# Patient Record
Sex: MALE | Race: WHITE | NOT HISPANIC OR LATINO | Employment: OTHER | URBAN - METROPOLITAN AREA
[De-identification: names, ages, dates, MRNs, and addresses within clinical notes are randomized per-mention and may not be internally consistent; named-entity substitution may affect disease eponyms.]

---

## 2017-07-20 ENCOUNTER — APPOINTMENT (OUTPATIENT)
Dept: GENERAL RADIOLOGY | Facility: HOSPITAL | Age: 72
End: 2017-07-20

## 2017-07-20 ENCOUNTER — HOSPITAL ENCOUNTER (INPATIENT)
Facility: HOSPITAL | Age: 72
LOS: 5 days | Discharge: HOME | End: 2017-07-25
Attending: INTERNAL MEDICINE | Admitting: INTERNAL MEDICINE

## 2017-07-20 PROBLEM — Z99.89 OSA ON CPAP: Status: ACTIVE | Noted: 2017-07-20

## 2017-07-20 PROBLEM — F10.11 H/O ETOH ABUSE: Status: ACTIVE | Noted: 2017-07-20

## 2017-07-20 PROBLEM — E87.1 HYPONATREMIA: Status: ACTIVE | Noted: 2017-07-20

## 2017-07-20 PROBLEM — E78.5 HLD (HYPERLIPIDEMIA): Status: ACTIVE | Noted: 2017-07-20

## 2017-07-20 PROBLEM — I10 HYPERTENSION: Status: ACTIVE | Noted: 2017-07-20

## 2017-07-20 PROBLEM — K21.9 GERD (GASTROESOPHAGEAL REFLUX DISEASE): Status: ACTIVE | Noted: 2017-07-20

## 2017-07-20 PROBLEM — R56.9 SEIZURE (HCC): Status: ACTIVE | Noted: 2017-07-20

## 2017-07-20 PROBLEM — G47.33 OSA ON CPAP: Status: ACTIVE | Noted: 2017-07-20

## 2017-07-20 PROBLEM — J44.9 COPD (CHRONIC OBSTRUCTIVE PULMONARY DISEASE) (HCC): Status: ACTIVE | Noted: 2017-07-20

## 2017-07-20 LAB
BASOPHILS # BLD AUTO: 0.01 10*3/MM3 (ref 0–0.2)
BASOPHILS NFR BLD AUTO: 0.1 % (ref 0–1)
DEPRECATED RDW RBC AUTO: 37.9 FL (ref 37–54)
EOSINOPHIL # BLD AUTO: 0.04 10*3/MM3 (ref 0–0.3)
EOSINOPHIL NFR BLD AUTO: 0.4 % (ref 0–3)
ERYTHROCYTE [DISTWIDTH] IN BLOOD BY AUTOMATED COUNT: 12.9 % (ref 11.3–14.5)
HCT VFR BLD AUTO: 34.9 % (ref 38.9–50.9)
HGB BLD-MCNC: 12.5 G/DL (ref 13.1–17.5)
IMM GRANULOCYTES # BLD: 0.03 10*3/MM3 (ref 0–0.03)
IMM GRANULOCYTES NFR BLD: 0.3 % (ref 0–0.6)
LYMPHOCYTES # BLD AUTO: 0.61 10*3/MM3 (ref 0.6–4.8)
LYMPHOCYTES NFR BLD AUTO: 6.1 % (ref 24–44)
MCH RBC QN AUTO: 29.1 PG (ref 27–31)
MCHC RBC AUTO-ENTMCNC: 35.8 G/DL (ref 32–36)
MCV RBC AUTO: 81.2 FL (ref 80–99)
MONOCYTES # BLD AUTO: 0.17 10*3/MM3 (ref 0–1)
MONOCYTES NFR BLD AUTO: 1.7 % (ref 0–12)
NEUTROPHILS # BLD AUTO: 9.14 10*3/MM3 (ref 1.5–8.3)
NEUTROPHILS NFR BLD AUTO: 91.4 % (ref 41–71)
PLATELET # BLD AUTO: 218 10*3/MM3 (ref 150–450)
PMV BLD AUTO: 9.6 FL (ref 6–12)
RBC # BLD AUTO: 4.3 10*6/MM3 (ref 4.2–5.76)
WBC NRBC COR # BLD: 10 10*3/MM3 (ref 3.5–10.8)

## 2017-07-20 PROCEDURE — 71010 HC CHEST PA OR AP: CPT

## 2017-07-20 PROCEDURE — 84100 ASSAY OF PHOSPHORUS: CPT | Performed by: INTERNAL MEDICINE

## 2017-07-20 PROCEDURE — 25010000002 THIAMINE PER 100 MG: Performed by: INTERNAL MEDICINE

## 2017-07-20 PROCEDURE — 83036 HEMOGLOBIN GLYCOSYLATED A1C: CPT | Performed by: INTERNAL MEDICINE

## 2017-07-20 PROCEDURE — 85025 COMPLETE CBC W/AUTO DIFF WBC: CPT | Performed by: INTERNAL MEDICINE

## 2017-07-20 PROCEDURE — 80307 DRUG TEST PRSMV CHEM ANLYZR: CPT | Performed by: NURSE PRACTITIONER

## 2017-07-20 PROCEDURE — 83930 ASSAY OF BLOOD OSMOLALITY: CPT | Performed by: NURSE PRACTITIONER

## 2017-07-20 PROCEDURE — 83935 ASSAY OF URINE OSMOLALITY: CPT | Performed by: INTERNAL MEDICINE

## 2017-07-20 PROCEDURE — 94799 UNLISTED PULMONARY SVC/PX: CPT

## 2017-07-20 PROCEDURE — 82533 TOTAL CORTISOL: CPT | Performed by: INTERNAL MEDICINE

## 2017-07-20 PROCEDURE — 25010000002 HEPARIN (PORCINE) PER 1000 UNITS: Performed by: INTERNAL MEDICINE

## 2017-07-20 PROCEDURE — 83880 ASSAY OF NATRIURETIC PEPTIDE: CPT | Performed by: NURSE PRACTITIONER

## 2017-07-20 PROCEDURE — 84145 PROCALCITONIN (PCT): CPT | Performed by: INTERNAL MEDICINE

## 2017-07-20 PROCEDURE — 83735 ASSAY OF MAGNESIUM: CPT | Performed by: INTERNAL MEDICINE

## 2017-07-20 PROCEDURE — 99291 CRITICAL CARE FIRST HOUR: CPT | Performed by: INTERNAL MEDICINE

## 2017-07-20 PROCEDURE — 80053 COMPREHEN METABOLIC PANEL: CPT | Performed by: INTERNAL MEDICINE

## 2017-07-20 PROCEDURE — 84443 ASSAY THYROID STIM HORMONE: CPT | Performed by: INTERNAL MEDICINE

## 2017-07-20 PROCEDURE — 84484 ASSAY OF TROPONIN QUANT: CPT | Performed by: INTERNAL MEDICINE

## 2017-07-20 RX ORDER — ASPIRIN 81 MG/1
81 TABLET, CHEWABLE ORAL DAILY
Status: DISCONTINUED | OUTPATIENT
Start: 2017-07-21 | End: 2017-07-25 | Stop reason: HOSPADM

## 2017-07-20 RX ORDER — ASPIRIN 81 MG/1
81 TABLET ORAL DAILY
COMMUNITY

## 2017-07-20 RX ORDER — ATORVASTATIN CALCIUM 20 MG/1
20 TABLET, FILM COATED ORAL NIGHTLY
Status: DISCONTINUED | OUTPATIENT
Start: 2017-07-20 | End: 2017-07-25 | Stop reason: HOSPADM

## 2017-07-20 RX ORDER — PANTOPRAZOLE SODIUM 40 MG/1
40 TABLET, DELAYED RELEASE ORAL
Status: DISCONTINUED | OUTPATIENT
Start: 2017-07-21 | End: 2017-07-25 | Stop reason: HOSPADM

## 2017-07-20 RX ORDER — LISINOPRIL 40 MG/1
40 TABLET ORAL DAILY
COMMUNITY
End: 2017-07-25 | Stop reason: HOSPADM

## 2017-07-20 RX ORDER — FOLIC ACID 5 MG/ML
1 INJECTION, SOLUTION INTRAMUSCULAR; INTRAVENOUS; SUBCUTANEOUS DAILY
Status: DISCONTINUED | OUTPATIENT
Start: 2017-07-21 | End: 2017-07-21 | Stop reason: SDUPTHER

## 2017-07-20 RX ORDER — LISINOPRIL 20 MG/1
20 TABLET ORAL
Status: DISCONTINUED | OUTPATIENT
Start: 2017-07-21 | End: 2017-07-25 | Stop reason: HOSPADM

## 2017-07-20 RX ORDER — MULTIVITAMIN WITH FOLIC ACID 400 MCG
1 TABLET ORAL DAILY
COMMUNITY

## 2017-07-20 RX ORDER — SODIUM CHLORIDE 9 MG/ML
75 INJECTION, SOLUTION INTRAVENOUS CONTINUOUS
Status: DISCONTINUED | OUTPATIENT
Start: 2017-07-20 | End: 2017-07-21

## 2017-07-20 RX ORDER — METOPROLOL TARTRATE 50 MG/1
50 TABLET, FILM COATED ORAL 2 TIMES DAILY
COMMUNITY
End: 2017-07-25 | Stop reason: HOSPADM

## 2017-07-20 RX ORDER — SODIUM CHLORIDE 0.9 % (FLUSH) 0.9 %
1-10 SYRINGE (ML) INJECTION AS NEEDED
Status: DISCONTINUED | OUTPATIENT
Start: 2017-07-20 | End: 2017-07-25 | Stop reason: HOSPADM

## 2017-07-20 RX ORDER — CETIRIZINE HYDROCHLORIDE 10 MG/1
10 TABLET ORAL DAILY
COMMUNITY

## 2017-07-20 RX ORDER — ATORVASTATIN CALCIUM 20 MG/1
20 TABLET, FILM COATED ORAL
COMMUNITY

## 2017-07-20 RX ORDER — THIAMINE HYDROCHLORIDE 100 MG/ML
500 INJECTION, SOLUTION INTRAMUSCULAR; INTRAVENOUS EVERY 8 HOURS
Status: DISCONTINUED | OUTPATIENT
Start: 2017-07-20 | End: 2017-07-20 | Stop reason: SDUPTHER

## 2017-07-20 RX ORDER — POTASSIUM CHLORIDE 1.5 G/1.77G
40 POWDER, FOR SOLUTION ORAL AS NEEDED
Status: DISCONTINUED | OUTPATIENT
Start: 2017-07-20 | End: 2017-07-20

## 2017-07-20 RX ORDER — MAGNESIUM SULFATE HEPTAHYDRATE 40 MG/ML
4 INJECTION, SOLUTION INTRAVENOUS ONCE
Status: DISCONTINUED | OUTPATIENT
Start: 2017-07-21 | End: 2017-07-25 | Stop reason: HOSPADM

## 2017-07-20 RX ORDER — HEPARIN SODIUM 5000 [USP'U]/ML
5000 INJECTION, SOLUTION INTRAVENOUS; SUBCUTANEOUS EVERY 8 HOURS SCHEDULED
Status: DISCONTINUED | OUTPATIENT
Start: 2017-07-20 | End: 2017-07-25 | Stop reason: HOSPADM

## 2017-07-20 RX ORDER — POTASSIUM CHLORIDE 750 MG/1
40 CAPSULE, EXTENDED RELEASE ORAL AS NEEDED
Status: DISCONTINUED | OUTPATIENT
Start: 2017-07-20 | End: 2017-07-25 | Stop reason: HOSPADM

## 2017-07-20 RX ORDER — GABAPENTIN 600 MG/1
600 TABLET ORAL 3 TIMES DAILY
COMMUNITY

## 2017-07-20 RX ORDER — IPRATROPIUM BROMIDE AND ALBUTEROL SULFATE 2.5; .5 MG/3ML; MG/3ML
3 SOLUTION RESPIRATORY (INHALATION)
Status: DISCONTINUED | OUTPATIENT
Start: 2017-07-20 | End: 2017-07-25 | Stop reason: HOSPADM

## 2017-07-20 RX ORDER — OMEPRAZOLE 40 MG/1
40 CAPSULE, DELAYED RELEASE ORAL EVERY MORNING
COMMUNITY

## 2017-07-20 RX ORDER — METOPROLOL TARTRATE 50 MG/1
50 TABLET, FILM COATED ORAL
Status: DISCONTINUED | OUTPATIENT
Start: 2017-07-21 | End: 2017-07-23

## 2017-07-20 RX ADMIN — HEPARIN SODIUM 5000 UNITS: 5000 INJECTION, SOLUTION INTRAVENOUS; SUBCUTANEOUS at 23:03

## 2017-07-20 RX ADMIN — SODIUM CHLORIDE 75 ML/HR: 9 INJECTION, SOLUTION INTRAVENOUS at 21:42

## 2017-07-20 RX ADMIN — THIAMINE HYDROCHLORIDE 500 MG: 100 INJECTION, SOLUTION INTRAMUSCULAR; INTRAVENOUS at 23:03

## 2017-07-20 RX ADMIN — ATORVASTATIN CALCIUM 20 MG: 20 TABLET, FILM COATED ORAL at 23:03

## 2017-07-20 NOTE — NURSING NOTE
"ACC REVIEW REPORT: [facility]        PATIENT NAME: Manuelito Harris    PATIENT ID: [id number]    BED: N204    BED TYPE: ICU      BED GIVEN TO:  DOLLY CARTAGENA RN     TIME BED GIVEN:     YOB: 1945    AGE: 71 YRS     GENDER: MALE    PREVIOUS ADMIT TO [facility]:    PREVIOUS ADMISSION DATE:     PATIENT CLASS:     TODAY'S DATE: 7/20/2017    TRANSFER DATE: 07/20/17    ETA:     TRANSFERRING FACILITY: [facility]    TRANSFERRING FACILITY PHONE # : [phone]    TRANSFERRING MD:     DATE/TIME REQUEST RECEIVED: 07/20/17 @ 9875    [facility] RN: JOHNNIE MAYBERRY     REPORT FROM: DOLLY CARTAGENA RN     TIME REPORT TAKEN: 2000    DIAGNOSIS: HYPONATREMIC ENCEPHALOPATHY    REASON FOR TRANSFER TO [facility]: HIGHER LEVEL CARE    TRANSPORTATION: GROUND    CLINICAL REASON FOR TRANSFER TO [facility]:  PATIENT HAS A HISTORY OF HAVING SEIZURES (TONIC CLONIC) WHEN HIS NA DROPS.  EMS BROUGHT PATIENT TO THE ER AND APPARENTLY ABOUT 1500, PATIENT WAS NOT RESPONDING TO VERBAL COMMANDS, HE WOULD RESPOND TO PAIN.  AT THAT TIME, HIS NA  AND NOW IT'S 116.  HE HAS HAD A TOTAL OF 250CC OF 3% SODIUM CHLORIDE.  HE STARTED IMPROVING RIGHT AWAY.  HE IS NOW ON 1/2 NS @ 80 ML/HR AND IS COMPLETELY ALERT AND ORIENTED.  AFTER RECEIVING A 50 CC BOLUS OF THE 3% HE STARTING MOANING AND AFTER RECEIVING  A BOLUS 100 ML, HE STARTED ANSWERING QUESTIONS.  HIS SISTER MAINTAINS THAT \"HE DID THIS ONCE BEFORE WHEN HIS SODIUM DROPPED.\" HE ALSO HAS COPD AND IS ON 2LNC.  HE RECEIVED SOLUMEDROL 125 MG AND A BREATHING TREATMENT X2.  HIS CXR SHOWS PNEUMONIA.  HE IS 93% ON A 30% VENTIMASK AT THIS TIME.        CLINICAL INFORMATION    HEIGHT:     WEIGHT:     ALLERGIES:  NKDA    WATSON:  NO    INFECTIOUS DISEASE:     ISOLATION:     1ST VITAL SIGNS:   TIME  2000  TEMP: AFEBRILE  PULSE: 88%  B/P  155/77  RESP  21      LAB INFORMATION:   WBC-12.8, CHLORIDE-74    CULTURE INFORMATION:     MEDS/IV FLUIDS: # 20 G / LAC      CARDIAC SYSTEM:  WNL        RESPIRATORY " SYSTEM:    LUNG SOUNDS:    CLEAR:   CRACKLES:   WHEEZES:   RHONCHI:   DIMINISHED: YES    ABG DATE:   07/20/17        ABG TIME:     ABG RESULTS:  THEY WERE WNL      O2 SAT: 93%      RADIOLOGY RESULTS:  PNEUMONIA    RESPIRATORY STATUS:       CNS/MUSCULOSKELETAL    ALERT AND ORIENTED:    PERSON: YES  PLACE: YES  TIME: YES        CAT SCAN RESULTS:   NO ACUTE PROCESS      CNS/MUSCULOSKELETAL NOTES:  PATIENT IS ALERT/ORIENTED X3      GI//GY        PAST MEDICAL HISTORY: HIGH CHOLESTEROL, FORMER SMOKER    OTHER SYMPTOM NOTES:     ADDITIONAL NOTES:   PATIENT'S SISTER SAID THAT HE HAD NOT BEEN EATING OR DRINKING FOR ABOUT 3 DAYS.  SHE SAID THAT HE HAD BEEN C/O SOB AND COUGH FOR 3 DAYS.            Rossana Carlson RN  7/20/2017  7:42 PM

## 2017-07-21 LAB
ALBUMIN SERPL-MCNC: 4.1 G/DL (ref 3.2–4.8)
ALBUMIN/GLOB SERPL: 1.3 G/DL (ref 1.5–2.5)
ALP SERPL-CCNC: 64 U/L (ref 25–100)
ALT SERPL W P-5'-P-CCNC: 32 U/L (ref 7–40)
ANION GAP SERPL CALCULATED.3IONS-SCNC: 10 MMOL/L (ref 3–11)
ANION GAP SERPL CALCULATED.3IONS-SCNC: 10 MMOL/L (ref 3–11)
ANION GAP SERPL CALCULATED.3IONS-SCNC: 11 MMOL/L (ref 3–11)
ANION GAP SERPL CALCULATED.3IONS-SCNC: 2 MMOL/L (ref 3–11)
ANION GAP SERPL CALCULATED.3IONS-SCNC: 3 MMOL/L (ref 3–11)
ANION GAP SERPL CALCULATED.3IONS-SCNC: 5 MMOL/L (ref 3–11)
ARTERIAL PATENCY WRIST A: ABNORMAL
AST SERPL-CCNC: 50 U/L (ref 0–33)
ATMOSPHERIC PRESS: ABNORMAL MMHG
BASE EXCESS BLDA CALC-SCNC: 3 MMOL/L (ref 0–2)
BASOPHILS # BLD AUTO: 0 10*3/MM3 (ref 0–0.2)
BASOPHILS NFR BLD AUTO: 0 % (ref 0–1)
BDY SITE: ABNORMAL
BILIRUB SERPL-MCNC: 1 MG/DL (ref 0.3–1.2)
BNP SERPL-MCNC: 72 PG/ML (ref 0–100)
BUN BLD-MCNC: 10 MG/DL (ref 9–23)
BUN BLD-MCNC: 10 MG/DL (ref 9–23)
BUN BLD-MCNC: 11 MG/DL (ref 9–23)
BUN BLD-MCNC: 13 MG/DL (ref 9–23)
BUN BLD-MCNC: 13 MG/DL (ref 9–23)
BUN BLD-MCNC: 15 MG/DL (ref 9–23)
BUN/CREAT SERPL: 14.3 (ref 7–25)
BUN/CREAT SERPL: 14.3 (ref 7–25)
BUN/CREAT SERPL: 16.3 (ref 7–25)
BUN/CREAT SERPL: 18.3 (ref 7–25)
BUN/CREAT SERPL: 18.6 (ref 7–25)
BUN/CREAT SERPL: 18.8 (ref 7–25)
CALCIUM SPEC-SCNC: 7.5 MG/DL (ref 8.7–10.4)
CALCIUM SPEC-SCNC: 8.1 MG/DL (ref 8.7–10.4)
CALCIUM SPEC-SCNC: 8.7 MG/DL (ref 8.7–10.4)
CALCIUM SPEC-SCNC: 8.7 MG/DL (ref 8.7–10.4)
CALCIUM SPEC-SCNC: 8.9 MG/DL (ref 8.7–10.4)
CALCIUM SPEC-SCNC: 9.1 MG/DL (ref 8.7–10.4)
CHLORIDE SERPL-SCNC: 78 MMOL/L (ref 99–109)
CHLORIDE SERPL-SCNC: 79 MMOL/L (ref 99–109)
CHLORIDE SERPL-SCNC: 80 MMOL/L (ref 99–109)
CHLORIDE SERPL-SCNC: 84 MMOL/L (ref 99–109)
CHLORIDE SERPL-SCNC: 87 MMOL/L (ref 99–109)
CHLORIDE SERPL-SCNC: 87 MMOL/L (ref 99–109)
CO2 BLDA-SCNC: 28.8 MMOL/L (ref 22–33)
CO2 SERPL-SCNC: 27 MMOL/L (ref 20–31)
CO2 SERPL-SCNC: 28 MMOL/L (ref 20–31)
CO2 SERPL-SCNC: 28 MMOL/L (ref 20–31)
CO2 SERPL-SCNC: 36 MMOL/L (ref 20–31)
COHGB MFR BLD: 1.7 % (ref 0–2)
CORTIS SERPL-MCNC: 23.6 MCG/DL
CREAT BLD-MCNC: 0.6 MG/DL (ref 0.6–1.3)
CREAT BLD-MCNC: 0.7 MG/DL (ref 0.6–1.3)
CREAT BLD-MCNC: 0.8 MG/DL (ref 0.6–1.3)
CREAT BLD-MCNC: 0.8 MG/DL (ref 0.6–1.3)
DEPRECATED RDW RBC AUTO: 39.6 FL (ref 37–54)
EOSINOPHIL # BLD AUTO: 0.01 10*3/MM3 (ref 0–0.3)
EOSINOPHIL NFR BLD AUTO: 0.1 % (ref 0–3)
ERYTHROCYTE [DISTWIDTH] IN BLOOD BY AUTOMATED COUNT: 13.1 % (ref 11.3–14.5)
ETHANOL BLD-MCNC: <10 MG/DL (ref 0–10)
GFR SERPL CREATININE-BSD FRML MDRD: 112 ML/MIN/1.73
GFR SERPL CREATININE-BSD FRML MDRD: 134 ML/MIN/1.73
GFR SERPL CREATININE-BSD FRML MDRD: 96 ML/MIN/1.73
GFR SERPL CREATININE-BSD FRML MDRD: 96 ML/MIN/1.73
GLOBULIN UR ELPH-MCNC: 3.1 GM/DL
GLUCOSE BLD-MCNC: 111 MG/DL (ref 70–100)
GLUCOSE BLD-MCNC: 128 MG/DL (ref 70–100)
GLUCOSE BLD-MCNC: 142 MG/DL (ref 70–100)
GLUCOSE BLD-MCNC: 187 MG/DL (ref 70–100)
GLUCOSE BLD-MCNC: 189 MG/DL (ref 70–100)
GLUCOSE BLD-MCNC: 609 MG/DL (ref 70–100)
GLUCOSE BLDC GLUCOMTR-MCNC: 143 MG/DL (ref 70–130)
HBA1C MFR BLD: 5.5 % (ref 4.8–5.6)
HCO3 BLDA-SCNC: 27.5 MMOL/L (ref 20–26)
HCT VFR BLD AUTO: 35.9 % (ref 38.9–50.9)
HCT VFR BLD CALC: 39.6 %
HGB BLD-MCNC: 12.8 G/DL (ref 13.1–17.5)
HGB BLDA-MCNC: 12.9 G/DL (ref 13.5–17.5)
HOROWITZ INDEX BLD+IHG-RTO: 21 %
IMM GRANULOCYTES # BLD: 0.03 10*3/MM3 (ref 0–0.03)
IMM GRANULOCYTES NFR BLD: 0.3 % (ref 0–0.6)
LYMPHOCYTES # BLD AUTO: 0.93 10*3/MM3 (ref 0.6–4.8)
LYMPHOCYTES NFR BLD AUTO: 8.9 % (ref 24–44)
MAGNESIUM SERPL-MCNC: 1.9 MG/DL (ref 1.3–2.7)
MAGNESIUM SERPL-MCNC: 3.4 MG/DL (ref 1.3–2.7)
MAGNESIUM SERPL-MCNC: 3.5 MG/DL (ref 1.3–2.7)
MCH RBC QN AUTO: 30 PG (ref 27–31)
MCHC RBC AUTO-ENTMCNC: 35.7 G/DL (ref 32–36)
MCV RBC AUTO: 84.1 FL (ref 80–99)
METHGB BLD QL: 0.5 % (ref 0–1.5)
MODALITY: ABNORMAL
MONOCYTES # BLD AUTO: 0.2 10*3/MM3 (ref 0–1)
MONOCYTES NFR BLD AUTO: 1.9 % (ref 0–12)
NEUTROPHILS # BLD AUTO: 9.29 10*3/MM3 (ref 1.5–8.3)
NEUTROPHILS NFR BLD AUTO: 88.8 % (ref 41–71)
OSMOLALITY SERPL: 247 MOSM/KG (ref 275–295)
OSMOLALITY UR: 444 MOSM/KG (ref 300–1100)
OXYHGB MFR BLDV: 89.9 % (ref 94–99)
PCO2 BLDA: 41.2 MM HG (ref 35–48)
PH BLDA: 7.43 PH UNITS (ref 7.35–7.45)
PHOSPHATE SERPL-MCNC: 2.5 MG/DL (ref 2.4–5.1)
PHOSPHATE SERPL-MCNC: 2.6 MG/DL (ref 2.4–5.1)
PLATELET # BLD AUTO: 245 10*3/MM3 (ref 150–450)
PMV BLD AUTO: 10.1 FL (ref 6–12)
PO2 BLDA: 59.3 MM HG (ref 83–108)
POTASSIUM BLD-SCNC: 3.7 MMOL/L (ref 3.5–5.5)
POTASSIUM BLD-SCNC: 4 MMOL/L (ref 3.5–5.5)
POTASSIUM BLD-SCNC: 4 MMOL/L (ref 3.5–5.5)
POTASSIUM BLD-SCNC: 4.1 MMOL/L (ref 3.5–5.5)
POTASSIUM BLD-SCNC: 4.4 MMOL/L (ref 3.5–5.5)
POTASSIUM BLD-SCNC: 4.9 MMOL/L (ref 3.5–5.5)
PROCALCITONIN SERPL-MCNC: <0.05 NG/ML
PROT SERPL-MCNC: 7.2 G/DL (ref 5.7–8.2)
RBC # BLD AUTO: 4.27 10*6/MM3 (ref 4.2–5.76)
SODIUM BLD-SCNC: 110 MMOL/L (ref 132–146)
SODIUM BLD-SCNC: 111 MMOL/L (ref 132–146)
SODIUM BLD-SCNC: 112 MMOL/L (ref 132–146)
SODIUM BLD-SCNC: 116 MMOL/L (ref 132–146)
SODIUM BLD-SCNC: 121 MMOL/L (ref 132–146)
SODIUM BLD-SCNC: 124 MMOL/L (ref 132–146)
SODIUM BLD-SCNC: 125 MMOL/L (ref 132–146)
SODIUM BLD-SCNC: 127 MMOL/L (ref 132–146)
SODIUM BLD-SCNC: 130 MMOL/L (ref 132–146)
TROPONIN I SERPL-MCNC: <0.006 NG/ML
TSH SERPL DL<=0.05 MIU/L-ACNC: 0.34 MIU/ML (ref 0.35–5.35)
WBC NRBC COR # BLD: 10.46 10*3/MM3 (ref 3.5–10.8)

## 2017-07-21 PROCEDURE — 83735 ASSAY OF MAGNESIUM: CPT | Performed by: INTERNAL MEDICINE

## 2017-07-21 PROCEDURE — 80048 BASIC METABOLIC PNL TOTAL CA: CPT | Performed by: INTERNAL MEDICINE

## 2017-07-21 PROCEDURE — 25010000002 MAGNESIUM SULFATE PER 500 MG OF MAGNESIUM: Performed by: INTERNAL MEDICINE

## 2017-07-21 PROCEDURE — 85025 COMPLETE CBC W/AUTO DIFF WBC: CPT | Performed by: INTERNAL MEDICINE

## 2017-07-21 PROCEDURE — 94640 AIRWAY INHALATION TREATMENT: CPT

## 2017-07-21 PROCEDURE — 25010000002 DESMOPRESSIN PER 1 MCG: Performed by: INTERNAL MEDICINE

## 2017-07-21 PROCEDURE — 94660 CPAP INITIATION&MGMT: CPT

## 2017-07-21 PROCEDURE — 82805 BLOOD GASES W/O2 SATURATION: CPT | Performed by: INTERNAL MEDICINE

## 2017-07-21 PROCEDURE — 82962 GLUCOSE BLOOD TEST: CPT

## 2017-07-21 PROCEDURE — 25010000002 THIAMINE PER 100 MG: Performed by: INTERNAL MEDICINE

## 2017-07-21 PROCEDURE — 94760 N-INVAS EAR/PLS OXIMETRY 1: CPT

## 2017-07-21 PROCEDURE — 99291 CRITICAL CARE FIRST HOUR: CPT | Performed by: INTERNAL MEDICINE

## 2017-07-21 PROCEDURE — 25010000002 HEPARIN (PORCINE) PER 1000 UNITS: Performed by: INTERNAL MEDICINE

## 2017-07-21 PROCEDURE — 94799 UNLISTED PULMONARY SVC/PX: CPT

## 2017-07-21 PROCEDURE — 5A09357 ASSISTANCE WITH RESPIRATORY VENTILATION, LESS THAN 24 CONSECUTIVE HOURS, CONTINUOUS POSITIVE AIRWAY PRESSURE: ICD-10-PCS | Performed by: INTERNAL MEDICINE

## 2017-07-21 PROCEDURE — 84100 ASSAY OF PHOSPHORUS: CPT | Performed by: INTERNAL MEDICINE

## 2017-07-21 PROCEDURE — 84295 ASSAY OF SERUM SODIUM: CPT | Performed by: INTERNAL MEDICINE

## 2017-07-21 PROCEDURE — 36600 WITHDRAWAL OF ARTERIAL BLOOD: CPT | Performed by: INTERNAL MEDICINE

## 2017-07-21 RX ORDER — DEXTROSE MONOHYDRATE 50 MG/ML
150 INJECTION, SOLUTION INTRAVENOUS CONTINUOUS
Status: DISCONTINUED | OUTPATIENT
Start: 2017-07-21 | End: 2017-07-22

## 2017-07-21 RX ORDER — TOLVAPTAN 15 MG/1
15 TABLET ORAL ONCE
Status: COMPLETED | OUTPATIENT
Start: 2017-07-21 | End: 2017-07-21

## 2017-07-21 RX ORDER — DESMOPRESSIN ACETATE 4 UG/ML
1 INJECTION, SOLUTION INTRAVENOUS; SUBCUTANEOUS ONCE
Status: COMPLETED | OUTPATIENT
Start: 2017-07-21 | End: 2017-07-21

## 2017-07-21 RX ORDER — DEXTROSE MONOHYDRATE 50 MG/ML
250 INJECTION, SOLUTION INTRAVENOUS CONTINUOUS
Status: DISCONTINUED | OUTPATIENT
Start: 2017-07-21 | End: 2017-07-22

## 2017-07-21 RX ADMIN — THIAMINE HYDROCHLORIDE 500 MG: 100 INJECTION, SOLUTION INTRAMUSCULAR; INTRAVENOUS at 14:12

## 2017-07-21 RX ADMIN — METOPROLOL TARTRATE 50 MG: 50 TABLET ORAL at 08:26

## 2017-07-21 RX ADMIN — ATORVASTATIN CALCIUM 20 MG: 20 TABLET, FILM COATED ORAL at 20:38

## 2017-07-21 RX ADMIN — DEXTROSE MONOHYDRATE 500 ML: 50 INJECTION, SOLUTION INTRAVENOUS at 20:39

## 2017-07-21 RX ADMIN — SODIUM CHLORIDE 50 ML/HR: 234 INJECTION INTRAMUSCULAR; INTRAVENOUS; SUBCUTANEOUS at 01:19

## 2017-07-21 RX ADMIN — DEXTROSE MONOHYDRATE 100 ML/HR: 50 INJECTION, SOLUTION INTRAVENOUS at 18:12

## 2017-07-21 RX ADMIN — IPRATROPIUM BROMIDE AND ALBUTEROL SULFATE 3 ML: .5; 3 SOLUTION RESPIRATORY (INHALATION) at 12:21

## 2017-07-21 RX ADMIN — METOPROLOL TARTRATE 25 MG: 25 TABLET ORAL at 20:38

## 2017-07-21 RX ADMIN — HEPARIN SODIUM 5000 UNITS: 5000 INJECTION, SOLUTION INTRAVENOUS; SUBCUTANEOUS at 21:20

## 2017-07-21 RX ADMIN — HEPARIN SODIUM 5000 UNITS: 5000 INJECTION, SOLUTION INTRAVENOUS; SUBCUTANEOUS at 14:11

## 2017-07-21 RX ADMIN — FOLIC ACID 1 MG: 5 INJECTION, SOLUTION INTRAMUSCULAR; INTRAVENOUS; SUBCUTANEOUS at 08:25

## 2017-07-21 RX ADMIN — THIAMINE HYDROCHLORIDE 500 MG: 100 INJECTION, SOLUTION INTRAMUSCULAR; INTRAVENOUS at 21:21

## 2017-07-21 RX ADMIN — DEXTROSE MONOHYDRATE 100 ML/HR: 50 INJECTION, SOLUTION INTRAVENOUS at 10:32

## 2017-07-21 RX ADMIN — IPRATROPIUM BROMIDE AND ALBUTEROL SULFATE 3 ML: .5; 3 SOLUTION RESPIRATORY (INHALATION) at 19:28

## 2017-07-21 RX ADMIN — HEPARIN SODIUM 5000 UNITS: 5000 INJECTION, SOLUTION INTRAVENOUS; SUBCUTANEOUS at 05:55

## 2017-07-21 RX ADMIN — PANTOPRAZOLE SODIUM 40 MG: 40 TABLET, DELAYED RELEASE ORAL at 05:55

## 2017-07-21 RX ADMIN — LISINOPRIL 20 MG: 20 TABLET ORAL at 08:25

## 2017-07-21 RX ADMIN — DEXTROSE MONOHYDRATE 150 ML/HR: 50 INJECTION, SOLUTION INTRAVENOUS at 20:39

## 2017-07-21 RX ADMIN — IPRATROPIUM BROMIDE AND ALBUTEROL SULFATE 3 ML: .5; 3 SOLUTION RESPIRATORY (INHALATION) at 08:30

## 2017-07-21 RX ADMIN — TOLVAPTAN 15 MG: 15 TABLET ORAL at 03:54

## 2017-07-21 RX ADMIN — THIAMINE HYDROCHLORIDE 500 MG: 100 INJECTION, SOLUTION INTRAMUSCULAR; INTRAVENOUS at 05:55

## 2017-07-21 RX ADMIN — MAGNESIUM SULFATE HEPTAHYDRATE 6 G: 500 INJECTION, SOLUTION INTRAMUSCULAR; INTRAVENOUS at 00:14

## 2017-07-21 RX ADMIN — IPRATROPIUM BROMIDE AND ALBUTEROL SULFATE 3 ML: .5; 3 SOLUTION RESPIRATORY (INHALATION) at 15:55

## 2017-07-21 RX ADMIN — ASPIRIN 81 MG CHEWABLE TABLET 81 MG: 81 TABLET CHEWABLE at 08:25

## 2017-07-21 RX ADMIN — DESMOPRESSIN ACETATE 1 MCG: 4 SOLUTION INTRAVENOUS at 18:58

## 2017-07-21 NOTE — PROGRESS NOTES
Discharge Planning Assessment  [facility]     Patient Name: Manuelito Harris  MRN: [id number]  Today's Date: 7/21/2017    Admit Date: 7/20/2017          Discharge Needs Assessment       07/21/17 1215    Living Environment    Lives With alone    Living Arrangements apartment    Home Accessibility no concerns    Stair Railings at Home none    Type of Financial/Environmental Concern none    Transportation Available family or friend will provide    Living Environment    Provides Primary Care For no one    Quality Of Family Relationships supportive    Able to Return to Prior Living Arrangements yes    Discharge Needs Assessment    Concerns To Be Addressed denies needs/concerns at this time    Readmission Within The Last 30 Days no previous admission in last 30 days    Equipment Currently Used at Home cane, straight;oxygen;bipap/ cpap    Equipment Needed After Discharge none            Discharge Plan       07/21/17 1216    Case Management/Social Work Plan    Plan Home    Patient/Family In Agreement With Plan yes    Additional Comments Spoke with patient at bedside.  Patient resides in [facility] and lives alone.  Prior to admission patient was independent with ADL's and mobility.  Patient has a straight cane at home that he uses sometimes, CPAP and oxygen. These are provided by [facility].  Patient does have portable tanks and was instructed to have here prior to discharge.  Patient voices understanding of this and states he has already told his daughter to bring one here.  Patient has used [facility] home health in the past but is not current with them.  If home health should be needed he would use them again.  Patient denies any discharge needs at this time.  CM will continue to follow.        Discharge Placement     No information found        Expected Discharge Date and Time     Expected Discharge Date Expected Discharge Time    Jul 25, 2017               Demographic Summary       07/21/17 1214    Referral Information     Admission Type inpatient    Arrived From another healthcare institution, not defined    Referral Source admission list    Reason For Consult discharge planning    Record Reviewed clinical discipline documentation;history and physical;medical record;patient profile    Contact Information    Permission Granted to Share Information With ;family/designee    Primary Care Physician Information    Name Claire Nolan            Functional Status     None            Psychosocial     None            Abuse/Neglect     None            Legal     None            Substance Abuse     None            Patient Forms     None          Flor Miles RN

## 2017-07-21 NOTE — PLAN OF CARE
Problem: Patient Care Overview (Adult)  Goal: Plan of Care Review  Outcome: Ongoing (interventions implemented as appropriate)    07/21/17 4427   Coping/Psychosocial Response Interventions   Plan Of Care Reviewed With patient   Patient Care Overview   Progress progress toward functional goals is gradual   Outcome Evaluation   Outcome Summary/Follow up Plan Na increased to 121, d5W started, ns dc'd; increased to 125-250ml d5 over 1 hour- Na remained 125, ddavp ordered; mg initially 1.9 last shift-6gm mag ordered-increased to 3.4         Problem: Pressure Ulcer Risk (Mayank Scale) (Adult,Obstetrics,Pediatric)  Goal: Skin Integrity  Outcome: Ongoing (interventions implemented as appropriate)    Problem: Nutrition, Enteral (Adult)  Goal: Signs and Symptoms of Listed Potential Problems Will be Absent or Manageable (Nutrition, Enteral)  Outcome: Ongoing (interventions implemented as appropriate)    Problem: Seizure Disorder/Epilepsy (Adult)  Goal: Signs and Symptoms of Listed Potential Problems Will be Absent or Manageable (Seizure Disorder/Epilepsy)  Outcome: Ongoing (interventions implemented as appropriate)

## 2017-07-21 NOTE — H&P
INTENSIVIST   PROGRESS NOTE     Hospital:  LOS: 0 days     Mr. Manuelito Harris, 71 y.o. male is followed for:      Hyponatremia    COPD (chronic obstructive pulmonary disease); on 2L continuously    GERD (gastroesophageal reflux disease)    REANNA on CPAP    Hypertension    HLD (hyperlipidemia)    As an Intensivist, we provide an integrated approach to the ICU patient and family, medical management of comorbid conditions, lead interdisciplinary rounds and coordinate the care with all other services, including those from other specialists.     Subjective   NICANOR Harris is a 71 y.o. male who was seen at ED due to changes in his mental status, and confusion. His Na was 112.    He was last admitted to our facility in October 2015 for AMS and hyponatremia related seizures.  He says this is the 3rd time he has had hyponatremia related seizures, though is uncertain when that hospitalization was.    Had a GTC seizure witnessed by family at home and was confused following this.  He was taken to [facility] where his Na+ was 112 on admission (1357).  Received a total of 250 ml of 3% NaCl,(Between 4 pm and 5 pm) and solumedrol 125 mg, and 2mcg of Desmopressin IV.  Repeat Na @ 1733 was 116.          PMH: He  has a past medical history of COPD (chronic obstructive pulmonary disease); ETOH abuse; GERD (gastroesophageal reflux disease); HLD (hyperlipidemia); Hypertension; Hyponatremia; and Seizure due to Hyponatremia.   PSxH: He  has no past surgical history on file.      Medications:  Cetirizine  ASA  Metoprolol  Omeprazole  Lisinopril  Atorvastatin  Gabapentin  Nebs with albuterol  Spiriva    Allergies: He has No Known Allergies.   FH: His family history is not on file.   SH: He  reports that he has quit smoking. He does not have any smokeless tobacco history on file. He reports that he does not drink alcohol or use illicit drugs.     ROS (14):   Review of Systems   Respiratory: Positive for cough and shortness of breath.   "  Cardiovascular: Negative for palpitations and leg swelling. Chest pain: at night.   Gastrointestinal: Negative for abdominal distention, abdominal pain, diarrhea, nausea and vomiting.   Neurological:        Forgetfulness   Psychiatric/Behavioral: Positive for confusion.   All other systems reviewed and are negative.    Objective   O     Vitals:    Temp  Min: 97.7 °F (36.5 °C)  Max: 97.7 °F (36.5 °C)  No Data Recorded  No Data Recorded  Resp  Min: 20  Max: 20  SpO2  Min: 93 %  Max: 93 % No Data Recorded      Physical Examination  Telemetry:  NSR   Constitutional:  No acute distress.   HEENT: Normocephalic and atraumatic.   Cardiovascular: Normal rate, regular and rhythm. Normal heart sounds.  No murmurs, gallop or rub.  No peripheral edema.   Respiratory: No respiratory distress. Normal respiratory effort.  Normal breath sounds  Scattered expiratory wheezes.    Abdominal:  Soft. No masses. Non-tender. + distension. No HSM.   Extremities: No digital cyanosis. No clubbing.   Neurological:   Alert and Oriented to person & place.   Psychiatric:  Normal affect. Normal behavior.   Lines/Drains/Airways: Peripherals     Na 112 (13:40)  Cr 0.7  OSm 235    Na 116 (17:16)  Images:  CXR 07/20/2017 Low volumes. Elevated right hemidiaphragm?     CT head 7/20/2017 Negative. ([facility])          Results: Reviewed.  I reviewed the patient's new laboratory and imaging results.  I independently reviewed the patient's new images.    Medications: Reviewed.    Assessment/Plan   A / P     71 y.o.male, admitted on 7/20/2017 with hyponatremic encephalopathy:     1. Hyponatremia, severe (Na < 120)  1. Seizures due to hyponatremia in the past and today at home.  2. At the [facility]: his Na was 112 on arrival, and he received \"boluses\" of 3% NaCl X 3 and desmopressin, with which Na went up to 116, probably over 1 hr or so.  3. Encephalopathy, lethargic at [facility], but awoke up as his Na level increased.  4. R/O Beer " "potomania  2. ETOH  3. COPD  1. Former Tobacco  2. He uses Home O2  4. REANNA  1. He has a CPAP at home  5. GERD  6. HTN  7. Dyslipidemia    Nutrition:      Advance Directives: No Order    Assessment / Plan:    1. Carefully monitor Na  1. Goal to increase Na by 4-6 meq/L during first 24 hrs. (Ideally no more than 9 meq/l over 24 hrs).  2. Avoid rapid correction of Na to prevent \"Osmotic Demyelination Syndrome\".  2. Seizure Precautions  3. Check TSH, Cortisol, Serum and Urine osmolarity    Plan of care and goals reviewed during interdisciplinary rounds.  I discussed the patient's findings and my recommendations with patient and nursing staff    Time:   Critical Care Time: was greater than 40 minutes.(excluding time spent on other separately billable procedures or treating other patients).   Patient was at high risk for life-threatening deterioration due to severe hyponatremia.     Rios Monsalve MD, FACP, FCCP, CNSC    Intensive Care Medicine and Pulmonary Medicine       "

## 2017-07-21 NOTE — PROGRESS NOTES
Multidisciplinary Rounds    Time: 20min  Patient Name: Manuelito Harris  Date of Encounter: 07/21/17 11:48 AM  MRN: [id number]  Admission date: 7/20/2017      Reason for visit: MDR. RD to continue to follow per protocol.     Additional information obtained during MDR: PO intake of 100% of past 2 documented meals    Current diet: Diet Regular; Consistent Carbohydrate, Cardiac      Intervention:  Follow treatment plan  Care plan reviewed    Follow up:   Per protocol      Candy Balderas MS RD/LD CNSC  11:48 AM

## 2017-07-21 NOTE — PROGRESS NOTES
I was called out of multidisciplinary Rounds to evaluate this patient who had developed worsening acute respiratory distress on top of chronic respiratory failure.  He was on a Trilogy ventilator through a mask with his Ish trach plugged at that time.  A recent blood gas had shown no improvement on the Trilogy.    Given his distress, was clear that he needed to keep put on mechanical ventilation.  I had a #6 endotracheal tube, #8 Shiley and #6 Shiley trach's available.  The patient's oxygen saturation was in the upper 90s.  I was able to place the #6 cuffed Shiley with some difficulty.  There was a small amount of oozing of blood from around the tracheostomy after the procedure but no blood from the tracheostomy.  He had positive color change on CO2 indicator and oxygen saturations of 100% post procedure.  The regular cuffed #6 Shiley is a tight fit and probably ought to be changed out for a proximal extra long when one is made available.

## 2017-07-21 NOTE — PROGRESS NOTES
Pulmonary/Critical Care Follow-up     LOS: 1 day   Patient Care Team:  Claire Nolan MD as PCP - General (Internal Medicine)  No Known Provider as PCP - Family Medicine    Chief Complaint/reason: Seizure/hyponatremia      Subjective    71-year-old male with a history of COPD, alcohol abuse, gastroesophageal reflux disease, hyperlipidemia, hyponatremia was admitted with altered mental status.  He apparently had a seizure at home on 7/20/2017.  He was taken to [facility] where his sodium was 112 on admission.  He received 250 mL of 3% saline as well as DDAVP and Solu-Medrol.  Repeat sodium was 116.  Patient was given tolvaptan here.    Interval History:   Unfortunately, patient had greater than expected response to tolvaptan and has had a significant rise in sodium.  D5W started to try to arrest rise in sodium and re-lower sodium.  Patient is without current complaint.  He does report recent bronchitis symptoms.  Denies current alcohol use.  He denies nausea, vomiting, diarrhea.     History taken from: patient, chart.     PMH/FH/Social History were reviewed and updated appropriately in the electronic medical record.     Review of Systems:    Review of 14 systems was completed with positives and pertinent negatives noted in the subjective section.  All other systems reviewed and are negative.         Objective     Vital Signs  Temp:  [96.8 °F (36 °C)-98.1 °F (36.7 °C)] 97.6 °F (36.4 °C)  Heart Rate:  [] 74  Resp:  [16-22] 18  BP: (101-166)/() 114/72  07/20 0701 - 07/21 0700  In: 1231.1 [P.O.:240; I.V.:891.1]  Out: 1600 [Urine:1600]  Body mass index is 27.84 kg/(m^2).     Physical Exam:     Constitutional:    Alert, cooperative, overweight, in no acute distress   Head:    Normocephalic, without obvious abnormality, atraumatic   Eyes:            Lids and lashes normal, conjunctivae and sclerae normal, no   icterus, no pallor, corneas clear, PER   ENMT:   Ears appear intact with no abnormalities  noted      No oral lesions, no thrush, oral mucosa moist   Neck:   No adenopathy, supple, trachea midline, no thyromegaly, no JVD       Lungs/Resp:     Normal effort, symmetric chest rise, no crepitus, clear to      auscultation bilaterally, no chest wall tenderness                Heart/CV:    Regular rhythm and normal rate, normal S1 and S2, no            murmur   Abdomen/GI:     Normal bowel sounds, no masses, no organomegaly, soft        non-tender, non-distended   :     Deferred   Extremities/MSK:   No clubbing or cyanosis.  No edema.  Normal tone.  No deformities.    Pulses:   Pulses palpable and equal bilaterally   Skin:   No bleeding, bruising or rash   Heme/Lymph:   No cervical or supraclavicular adenopathy.    Neurologic:    Psychiatric:       Moves all extremities with no obvious focal motor deficit.  Cranial nerves 2 - 12 grossly intact   Oriented x 3, normal affect.             Results Review:     I reviewed the patient's new clinical results.     Results from last 7 days  Lab Units 07/21/17  0845 07/21/17  0126 07/20/17  2227   SODIUM mmol/L 121* 112* 116*   POTASSIUM mmol/L 4.1 3.7 4.0   CHLORIDE mmol/L 84* 79* 78*   CO2 mmol/L 27.0 28.0 27.0   BUN mg/dL 13 11 10   CREATININE mg/dL 0.80 0.60 0.70   CALCIUM mg/dL 8.7 8.1* 8.7   BILIRUBIN mg/dL  --   --  1.0   ALK PHOS U/L  --   --  64   ALT (SGPT) U/L  --   --  32   AST (SGOT) U/L  --   --  50*   GLUCOSE mg/dL 189* 187* 142*       Results from last 7 days  Lab Units 07/21/17  0845 07/20/17  2227   WBC 10*3/mm3 10.46 10.00   HEMOGLOBIN g/dL 12.8* 12.5*   HEMATOCRIT % 35.9* 34.9*   PLATELETS 10*3/mm3 245 218       Results from last 7 days  Lab Units 07/21/17  0000   PH, ARTERIAL pH units 7.433   PO2 ART mm Hg 59.3*   PCO2, ARTERIAL mm Hg 41.2   HCO3 ART mmol/L 27.5*       Results from last 7 days  Lab Units 07/21/17  0845 07/20/17  2227   MAGNESIUM mg/dL 3.5* 1.9   PHOSPHORUS mg/dL 2.6 2.5       I reviewed the patient's new imaging including images and  reports.  Chest x-ray done 7/20/2017: Bibasilar atelectasis/scarring.  Old right clavicle fracture.  Right hemidiaphragm elevation which is chronic.  No acute abnormality.    Medication Review:     Pharmacy Consult  Does not apply Daily   aspirin 81 mg Oral Daily   atorvastatin 20 mg Oral Nightly   folic acid (FOLVITE) IVPB 1 mg Intravenous Daily   heparin (porcine) 5,000 Units Subcutaneous Q8H   ipratropium-albuterol 3 mL Nebulization 4x Daily - RT   lisinopril 20 mg Oral Q24H   magnesium sulfate 4 g Intravenous Once   metoprolol tartrate 25 mg Oral Q24H   metoprolol tartrate 50 mg Oral Q24H   pantoprazole 40 mg Oral Q AM   thiamine (VITAMIN B1) IVPB 500 mg Intravenous Q8H       dextrose 100 mL/hr Last Rate: 100 mL/hr (07/21/17 1032)       Assessment/Plan     Principal Problem:    Hyponatremia  Active Problems:    Seizure    COPD (chronic obstructive pulmonary disease); on 2L continuously    Hypertension    GERD (gastroesophageal reflux disease)    REANNA on CPAP    HLD (hyperlipidemia)    H/O ETOH abuse    71-year-old who presented with altered mental status and had a seizure secondary to hyponatremia.  He had an overly rapid rise in his sodium and I'm currently trying to eliminate any further rise and ideally decrease his sodium by 2-4 mmol per liter.     Plan:  1.  Continue D5W, bolused 250 mL of D5W for 1 hour.  2.  1 µg subcutaneous DDAVP now.  3.  Continue ICU care.  4.  Every 2 hours sodiums for now.  5.  CPAP at night.  6.  BMP/CBC in a.m.      Kings Harrington MD  07/21/17  11:15 AM    35 minutes of critical care provided. This time excludes other billable procedures. Time does include preparation of documents, medical consultations, review of old records, and direct bedside care. Patient was at high risk for life-threatening deterioration due to seizures, hyponatremia.       *. Please note that portions of this note were completed with a voice recognition program. Efforts were made to edit the dictations,  but occasionally words are mistranscribed.

## 2017-07-21 NOTE — PAYOR COMM NOTE
"Manuelito Loyola (71 y.o. Male) # [id number]   ICD-10: E87.1   NPI/Facility: [id number]  NPI/MD: [id number]    Date of Birth Social Security Number Address Home Phone MRN    1945  [id number] [place] [address] [phone] [id number]    Hoahaoism Marital Status          None        Admission Date Admission Type Admitting Provider Attending Provider Department, Room/Bed    7/20/17 Urgent Rios Monsalve MD Villaran, Yuri, MD [facility] 2A ICU, N204/1    Discharge Date Discharge Disposition Discharge Destination                      Attending Provider: Rios Monsalve MD     Allergies:  No Known Allergies    Isolation:  None   Infection:  None   Code Status:  FULL    Ht:  70\" (177.8 cm)   Wt:  194 lb 0.1 oz (88 kg)    Admission Cmt:  None   Principal Problem:  Hyponatremia [E87.1]                 Active Insurance as of 7/20/2017     Primary Coverage     Payor Plan Insurance Group Employer/Plan Group    MEDICARE MEDICARE B ONLY      Payor Plan Address Payor Plan Phone Number Effective From Effective To    [address] [address] [phone] 11/1/2012     [place] [address]       Subscriber Name Subscriber Birth Date Member ID       MANUELITO LOYOLA 1945 [id number]           Secondary Coverage     Payor Plan Insurance Group Employer/Plan Group    [employer] [place] MEDICAID      Payor Plan Address Payor Plan Phone Number Effective From Effective To    [address] [address] [phone] 7/20/2017     [place] [address]       Subscriber Name Subscriber Birth Date Member ID       MANUELITO LOYOLA 1945 [id number]                 Emergency Contacts      (Rel.) Home Phone Work Phone Mobile Phone    Silvia Singh (Daughter) [phone] -- [phone]               History & Physical      Rios Monsalve MD at 7/20/2017  9:52 PM          INTENSIVIST   PROGRESS NOTE     Hospital:  LOS: 0 days     Mr. Manuelito Loyola, 71 y.o. male is followed for:      Hyponatremia    COPD (chronic " obstructive pulmonary disease); on 2L continuously    GERD (gastroesophageal reflux disease)    REANNA on CPAP    Hypertension    HLD (hyperlipidemia)    As an Intensivist, we provide an integrated approach to the ICU patient and family, medical management of comorbid conditions, lead interdisciplinary rounds and coordinate the care with all other services, including those from other specialists.     Subjective   NICANOR Harris is a 71 y.o. male who was seen at ED due to changes in his mental status, and confusion. His Na was 112.    He was last admitted to our facility in October 2015 for AMS and hyponatremia related seizures.  He says this is the 3rd time he has had hyponatremia related seizures, though is uncertain when that hospitalization was.    Had a GTC seizure witnessed by family at home and was confused following this.  He was taken to [facility] where his Na+ was 112 on admission (1357).  Received a total of 250 ml of 3% NaCl,(Between 4 pm and 5 pm) and solumedrol 125 mg, and 2mcg of Desmopressin IV.  Repeat Na @ 1733 was 116.          PMH: He  has a past medical history of COPD (chronic obstructive pulmonary disease); ETOH abuse; GERD (gastroesophageal reflux disease); HLD (hyperlipidemia); Hypertension; Hyponatremia; and Seizure due to Hyponatremia.   PSxH: He  has no past surgical history on file.      Medications:  Cetirizine  ASA  Metoprolol  Omeprazole  Lisinopril  Atorvastatin  Gabapentin  Nebs with albuterol  Spiriva    Allergies: He has No Known Allergies.   FH: His family history is not on file.   SH: He  reports that he has quit smoking. He does not have any smokeless tobacco history on file. He reports that he does not drink alcohol or use illicit drugs.     ROS (14):   Review of Systems   Respiratory: Positive for cough and shortness of breath.    Cardiovascular: Negative for palpitations and leg swelling. Chest pain: at night.   Gastrointestinal: Negative for abdominal distention, abdominal pain,  "diarrhea, nausea and vomiting.   Neurological:        Forgetfulness   Psychiatric/Behavioral: Positive for confusion.   All other systems reviewed and are negative.    Objective   O     Vitals:    Temp  Min: 97.7 °F (36.5 °C)  Max: 97.7 °F (36.5 °C)  No Data Recorded  No Data Recorded  Resp  Min: 20  Max: 20  SpO2  Min: 93 %  Max: 93 % No Data Recorded      Physical Examination  Telemetry:  NSR   Constitutional:  No acute distress.   HEENT: Normocephalic and atraumatic.   Cardiovascular: Normal rate, regular and rhythm. Normal heart sounds.  No murmurs, gallop or rub.  No peripheral edema.   Respiratory: No respiratory distress. Normal respiratory effort.  Normal breath sounds  Scattered expiratory wheezes.    Abdominal:  Soft. No masses. Non-tender. + distension. No HSM.   Extremities: No digital cyanosis. No clubbing.   Neurological:   Alert and Oriented to person & place.   Psychiatric:  Normal affect. Normal behavior.   Lines/Drains/Airways: Peripherals     Na 112 (13:40)  Cr 0.7  OSm 235    Na 116 (17:16)  Images:  CXR 07/20/2017 Low volumes. Elevated right hemidiaphragm?     CT head 7/20/2017 Negative. ([facility])          Results: Reviewed.  I reviewed the patient's new laboratory and imaging results.  I independently reviewed the patient's new images.    Medications: Reviewed.    Assessment/Plan   A / P     71 y.o.male, admitted on 7/20/2017 with hyponatremic encephalopathy:     1. Hyponatremia, severe (Na < 120)  1. Seizures due to hyponatremia in the past and today at home.  2. At the [facility]: his Na was 112 on arrival, and he received \"boluses\" of 3% NaCl X 3 and desmopressin, with which Na went up to 116, probably over 1 hr or so.  3. Encephalopathy, lethargic at [facility], but awoke up as his Na level increased.  4. R/O Beer potomania  2. ETOH  3. COPD  1. Former Tobacco  2. He uses Home O2  4. REANNA  1. He has a CPAP at home  5. GERD  6. HTN  7. Dyslipidemia    Nutrition:    " "  Advance Directives: No Order    Assessment / Plan:    1. Carefully monitor Na  1. Goal to increase Na by 4-6 meq/L during first 24 hrs. (Ideally no more than 9 meq/l over 24 hrs).  2. Avoid rapid correction of Na to prevent \"Osmotic Demyelination Syndrome\".  2. Seizure Precautions  3. Check TSH, Cortisol, Serum and Urine osmolarity    Plan of care and goals reviewed during interdisciplinary rounds.  I discussed the patient's findings and my recommendations with patient and nursing staff    Time:   Critical Care Time: was greater than 40 minutes.(excluding time spent on other separately billable procedures or treating other patients).   Patient was at high risk for life-threatening deterioration due to severe hyponatremia.     Rios Monsalve MD, FACP, FCCP, [facility]    Intensive Care Medicine and Pulmonary Medicine          Electronically signed by Rios Monsalve MD at 7/20/2017 11:01 PM        Emergency Department Notes     No notes of this type exist for this encounter.        Hospital Medications (active)       Dose Frequency Start End    ! Home medications stored in the pharmacy.  Daily 7/21/2017     Sig - Route: Daily. - Does not apply    3% Hypertonic saline - 50 mL dose  50 mL/hr Once 7/21/2017 7/21/2017    Sig - Route: Infuse 50 mL/hr into a venous catheter 1 (One) Time. - Intravenous    aspirin chewable tablet 81 mg 81 mg Daily 7/21/2017     Sig - Route: Chew 1 tablet Daily. - Oral    atorvastatin (LIPITOR) tablet 20 mg 20 mg Nightly 7/20/2017     Sig - Route: Take 1 tablet by mouth Every Night. - Oral    folic acid 1 mg in sodium chloride 0.9 % 50 mL IVPB 1 mg Daily 7/21/2017     Sig - Route: Infuse 1 mg into a venous catheter Daily. - Intravenous    heparin (porcine) 5000 UNIT/ML injection 5,000 Units 5,000 Units Every 8 Hours Scheduled 7/20/2017     Sig - Route: Inject 1 mL under the skin Every 8 (Eight) Hours. - Subcutaneous    ipratropium-albuterol (DUO-NEB) nebulizer solution 3 mL 3 mL 4 Times Daily - RT " 7/20/2017     Sig - Route: Take 3 mL by nebulization 4 (Four) Times a Day. - Nebulization    lisinopril (PRINIVIL,ZESTRIL) tablet 20 mg 20 mg Every 24 Hours Scheduled 7/21/2017     Sig - Route: Take 1 tablet by mouth Daily. - Oral    magnesium sulfate 4g/100mL (PREMIX) infusion 4 g Once 7/21/2017     Sig - Route: Infuse 100 mL into a venous catheter 1 (One) Time. - Intravenous    magnesium sulfate 6 g in dextrose (D5W) 5 % 500 mL (0.012 g/mL) IVPB 6 g Once 7/20/2017 7/21/2017    Sig - Route: Infuse 6 g into a venous catheter 1 (One) Time. - Intravenous    metoprolol tartrate (LOPRESSOR) tablet 25 mg 25 mg Every 24 Hours Scheduled 7/21/2017     Sig - Route: Take 1 tablet by mouth Daily. - Oral    metoprolol tartrate (LOPRESSOR) tablet 50 mg 50 mg Every 24 Hours Scheduled 7/21/2017     Sig - Route: Take 1 tablet by mouth Daily. - Oral    pantoprazole (PROTONIX) EC tablet 40 mg 40 mg Every Early Morning 7/21/2017     Sig - Route: Take 1 tablet by mouth Every Morning. - Oral    potassium chloride (MICRO-K) CR capsule 40 mEq 40 mEq As Needed 7/20/2017     Sig - Route: Take 4 capsules by mouth As Needed (potassium replacement.  see admin instructions). - Oral    sodium chloride 0.9 % flush 1-10 mL 1-10 mL As Needed 7/20/2017     Sig - Route: Infuse 1-10 mL into a venous catheter As Needed for Line Care. - Intravenous    sodium chloride 0.9 % infusion 75 mL/hr Continuous 7/20/2017     Sig - Route: Infuse 75 mL/hr into a venous catheter Continuous. - Intravenous    thiamine (B-1) 500 mg in sodium chloride 0.9 % 100 mL IVPB 500 mg Every 8 Hours Scheduled 7/20/2017 7/23/2017    Sig - Route: Infuse 500 mg into a venous catheter Every 8 (Eight) Hours. - Intravenous    tolvaptan (SAMSCA) tablet 15 mg 15 mg Once 7/21/2017 7/21/2017    Sig - Route: Take 1 tablet by mouth 1 (One) Time. - Oral    folic acid injection 1 mg (Discontinued) 1 mg Daily 7/21/2017 7/21/2017    Sig - Route: Infuse 0.2 mL into a venous catheter Daily. -  Intravenous    Reason for Discontinue: Reorder    potassium chloride (KLOR-CON) packet 40 mEq (Discontinued) 40 mEq As Needed 7/20/2017 7/20/2017    Sig - Route: Take 40 mEq by mouth As Needed (potassium replacement, see admin instructions). - Oral    thiamine (B-1) injection 500 mg (Discontinued) 500 mg Every 8 Hours 7/20/2017 7/20/2017    Sig - Route: Infuse 5 mL into a venous catheter Every 8 (Eight) Hours. - Intravenous    Reason for Discontinue: Reorder            Lab Results (last 24 hours)     Procedure Component Value Units Date/Time    CBC & Differential [[id number]] Collected:  07/20/17 2227    Specimen:  Blood Updated:  07/20/17 2315    Narrative:       The following orders were created for panel order CBC & Differential.  Procedure                               Abnormality         Status                     ---------                               -----------         ------                     CBC Auto Differential[[id number]]        Abnormal            Final result                 Please view results for these tests on the individual orders.    CBC Auto Differential [[id number]]  (Abnormal) Collected:  07/20/17 2227    Specimen:  Blood Updated:  07/20/17 2315     WBC 10.00 10*3/mm3      RBC 4.30 10*6/mm3      Hemoglobin 12.5 (L) g/dL      Hematocrit 34.9 (L) %      MCV 81.2 fL      MCH 29.1 pg      MCHC 35.8 g/dL      RDW 12.9 %      RDW-SD 37.9 fl      MPV 9.6 fL      Platelets 218 10*3/mm3      Neutrophil % 91.4 (H) %      Lymphocyte % 6.1 (L) %      Monocyte % 1.7 %      Eosinophil % 0.4 %      Basophil % 0.1 %      Immature Grans % 0.3 %      Neutrophils, Absolute 9.14 (H) 10*3/mm3      Lymphocytes, Absolute 0.61 10*3/mm3      Monocytes, Absolute 0.17 10*3/mm3      Eosinophils, Absolute 0.04 10*3/mm3      Basophils, Absolute 0.01 10*3/mm3      Immature Grans, Absolute 0.03 10*3/mm3     Magnesium [[id number]]  (Normal) Collected:  07/20/17 2227    Specimen:  Blood Updated:  07/21/17 0004     Magnesium 1.9  mg/dL     Phosphorus [[id number]]  (Normal) Collected:  07/20/17 2227    Specimen:  Blood Updated:  07/21/17 0004     Phosphorus 2.5 mg/dL     TSH [[id number]]  (Abnormal) Collected:  07/20/17 2227    Specimen:  Blood Updated:  07/21/17 0004     TSH 0.342 (L) mIU/mL     Narrative:       Due to abnormal TSH results, suggest ordering Free T4.    Troponin [[id number]]  (Normal) Collected:  07/20/17 2227    Specimen:  Blood Updated:  07/21/17 0004     Troponin I <0.006 ng/mL     Narrative:       Ultra Troponin I Reference Range:    <=0.039 ng/mL: Negative  0.04-0.779 ng/mL: Indeterminate Range. Clinical correlation required.  >=0.78  ng/mL: Consistent with myocardial injury. Clinical correlation required.    Blood Gas, Arterial [[id number]]  (Abnormal) Collected:  07/21/17 0000    Specimen:  Arterial Blood Updated:  07/21/17 0006     Site Arterial: right radial     Melecio's Test N/A     pH, Arterial 7.433 pH units      pCO2, Arterial 41.2 mm Hg      pO2, Arterial 59.3 (L) mm Hg      HCO3, Arterial 27.5 (H) mmol/L      Base Excess, Arterial 3.0 (H) mmol/L      Hemoglobin, Blood Gas 12.9 (L) g/dL      Hematocrit, Blood Gas 39.6 %      Oxyhemoglobin 89.9 (L) %      Methemoglobin 0.5 %      Carboxyhemoglobin 1.7 %      CO2 Content 28.8     Barometric Pressure for Blood Gas -- mmHg       N/A        Modality Room air     FIO2 21 %     Comprehensive Metabolic Panel [[id number]]  (Abnormal) Collected:  07/20/17 2227    Specimen:  Blood Updated:  07/21/17 0010     Glucose 142 (H) mg/dL      BUN 10 mg/dL      Creatinine 0.70 mg/dL      Sodium 116 (C) mmol/L      Potassium 4.0 mmol/L      Chloride 78 (C) mmol/L      CO2 27.0 mmol/L      Calcium 8.7 mg/dL      Total Protein 7.2 g/dL      Albumin 4.10 g/dL      ALT (SGPT) 32 U/L      AST (SGOT) 50 (H) U/L      Alkaline Phosphatase 64 U/L      Total Bilirubin 1.0 mg/dL      eGFR Non African Amer 112 mL/min/1.73      Globulin 3.1 gm/dL      A/G Ratio 1.3 (L) g/dL      BUN/Creatinine Ratio  14.3     Anion Gap 11.0 mmol/L     Narrative:       National Kidney Foundation Guidelines    Stage     Description        GFR  1         Normal or High     90+  2         Mild decrease      60-89  3         Moderate decrease  30-59  4         Severe decrease    15-29  5         Kidney failure     <15    Procalcitonin [[id number]] Collected:  07/20/17 2227    Specimen:  Blood Updated:  07/21/17 0014     Procalcitonin <0.05 ng/mL     Narrative:       As a Marker for Sepsis (Non-Neonates):   1. <0.5 ng/mL represents a low risk of severe sepsis and/or septic shock.  2. >2 ng/mL represents a high risk of severe sepsis and/or septic shock.    As a Marker for Lower Respiratory Tract Infections that require antibiotic therapy:    PCT on Admission     Antibiotic Therapy       6-12 Hrs later  > 0.5                Strongly Recommended             >0.25 - <0.5         Recommended  0.1 - 0.25           Discouraged              Remeasure/reassess PCT  <0.1                 Strongly Discouraged     Remeasure/reassess PCT                     PCT values of < 0.5 ng/mL do not exclude an infection, because localized infections (without systemic signs) may be associated with such low concentrations, or a systemic infection in its initial stages (< 6 hours). Furthermore, increased PCT can occur without infection. PCT concentrations between 0.5 and 2.0 ng/mL should be interpreted taking into account the patient's history. It is recommended to retest PCT within 6-24 hours if any concentrations < 2 ng/mL are obtained.    Osmolality, Serum [[id number]]  (Abnormal) Collected:  07/20/17 2227    Specimen:  Blood Updated:  07/21/17 0023     Osmolality 247 (L) mOsm/kg     BNP [[id number]]  (Normal) Collected:  07/20/17 2227    Specimen:  Blood Updated:  07/21/17 0025     BNP 72.0 pg/mL     Osmolality, Urine [[id number]]  (Normal) Collected:  07/20/17 2218    Specimen:  Urine from Urine, Clean Catch Updated:  07/21/17 0031     Osmolality, Urine 444  mOsm/kg     Cortisol [[id number]] Collected:  07/20/17 2227    Specimen:  Blood Updated:  07/21/17 0032     Cortisol 23.60 mcg/dL     Ethanol [[id number]]  (Normal) Collected:  07/20/17 2227    Specimen:  Blood Updated:  07/21/17 0049     Ethanol <10 mg/dL     Hemoglobin A1c [[id number]]  (Normal) Collected:  07/20/17 2227    Specimen:  Blood Updated:  07/21/17 0133     Hemoglobin A1C 5.50 %     Narrative:       The American Diabetes Association recommends maintenance of Hemoglobin A1C at 7.0% or lower. Goals for Hemoglobin A1C reduction may need to be modified if hypoglycemia is a problem.    Basic Metabolic Panel [[id number]]  (Abnormal) Collected:  07/21/17 0126    Specimen:  Blood Updated:  07/21/17 0226     Glucose 187 (H) mg/dL      BUN 11 mg/dL      Creatinine 0.60 mg/dL      Sodium 112 (C) mmol/L      Potassium 3.7 mmol/L      Chloride 79 (C) mmol/L      CO2 28.0 mmol/L      Calcium 8.1 (L) mg/dL      eGFR Non African Amer 134 mL/min/1.73      BUN/Creatinine Ratio 18.3     Anion Gap 5.0 mmol/L     Narrative:       National Kidney Foundation Guidelines    Stage     Description        GFR  1         Normal or High     90+  2         Mild decrease      60-89  3         Moderate decrease  30-59  4         Severe decrease    15-29  5         Kidney failure     <15        Imaging Results (last 24 hours)     Procedure Component Value Units Date/Time    XR Chest 1 View [[id number]]  (Abnormal) Collected:  07/20/17 2133     Updated:  07/20/17 2227    Narrative:       EXAM:  XR Chest, 1 View    CLINICAL HISTORY:  71 years old, male; Condition or disease; Lung condition and disease; Copd    TECHNIQUE:  Frontal view of the chest.    COMPARISON:  CR - XR CHEST 1 VIEW PORTABLE 10/16/2015 3:08:52 AM    FINDINGS:  Lungs:  Minimal bibasilar atelectasis and/or scarring.  Pleural space:  Normal.  No pneumothorax.  Heart:  Normal.  No cardiomegaly.  Mediastinum:  Normal.  Bones/joints:  Old healed right mid clavicle  fracture.  Vasculature:  Minimal atherosclerotic disease of the thoracic aorta.  Upper abdomen: Mild elevation of the hemidiaphragm, similar to previous study.      Impression:         1. No acute findings.    2. Non-acute findings are described above.    THIS DOCUMENT HAS BEEN ELECTRONICALLY SIGNED BY SOLIS MELGAR MD        Physician Progress Notes (last 24 hours) (Notes from 7/20/2017  8:35 AM through 7/21/2017  8:35 AM)     No notes of this type exist for this encounter.        Consult Notes (last 72 hours) (Notes from 7/18/2017  8:35 AM through 7/21/2017  8:35 AM)     No notes of this type exist for this encounter.

## 2017-07-21 NOTE — PLAN OF CARE
Problem: Patient Care Overview (Adult)  Goal: Plan of Care Review  Outcome: Ongoing (interventions implemented as appropriate)    07/21/17 0519   Coping/Psychosocial Response Interventions   Plan Of Care Reviewed With patient   Patient Care Overview   Progress no change   Outcome Evaluation   Outcome Summary/Follow up Plan Transferred from [facility] with Hyponaturima. Sodium was 116 at 2230, 50ml of 3% hypertonic saline given. Sodium was 112 at 0130 , 1x dose of 15mg of Samsca given. Awaiting AM labs.          Problem: Pressure Ulcer Risk (Mayank Scale) (Adult,Obstetrics,Pediatric)  Goal: Identify Related Risk Factors and Signs and Symptoms  Outcome: Outcome(s) achieved Date Met:  07/21/17  Goal: Skin Integrity  Outcome: Ongoing (interventions implemented as appropriate)    Problem: Nutrition, Enteral (Adult)  Goal: Signs and Symptoms of Listed Potential Problems Will be Absent or Manageable (Nutrition, Enteral)  Outcome: Ongoing (interventions implemented as appropriate)    Problem: Seizure Disorder/Epilepsy (Adult)  Goal: Signs and Symptoms of Listed Potential Problems Will be Absent or Manageable (Seizure Disorder/Epilepsy)  Outcome: Ongoing (interventions implemented as appropriate)

## 2017-07-22 LAB
ANION GAP SERPL CALCULATED.3IONS-SCNC: 4 MMOL/L (ref 3–11)
ANION GAP SERPL CALCULATED.3IONS-SCNC: 6 MMOL/L (ref 3–11)
BASOPHILS # BLD AUTO: 0.03 10*3/MM3 (ref 0–0.2)
BASOPHILS NFR BLD AUTO: 0.2 % (ref 0–1)
BUN BLD-MCNC: 10 MG/DL (ref 9–23)
BUN BLD-MCNC: 12 MG/DL (ref 9–23)
BUN/CREAT SERPL: 15 (ref 7–25)
BUN/CREAT SERPL: 16.7 (ref 7–25)
CALCIUM SPEC-SCNC: 7.9 MG/DL (ref 8.7–10.4)
CALCIUM SPEC-SCNC: 8.5 MG/DL (ref 8.7–10.4)
CHLORIDE SERPL-SCNC: 85 MMOL/L (ref 99–109)
CHLORIDE SERPL-SCNC: 91 MMOL/L (ref 99–109)
CO2 SERPL-SCNC: 30 MMOL/L (ref 20–31)
CO2 SERPL-SCNC: 31 MMOL/L (ref 20–31)
CREAT BLD-MCNC: 0.6 MG/DL (ref 0.6–1.3)
CREAT BLD-MCNC: 0.8 MG/DL (ref 0.6–1.3)
DEPRECATED RDW RBC AUTO: 43 FL (ref 37–54)
EOSINOPHIL # BLD AUTO: 0.1 10*3/MM3 (ref 0–0.3)
EOSINOPHIL NFR BLD AUTO: 0.7 % (ref 0–3)
ERYTHROCYTE [DISTWIDTH] IN BLOOD BY AUTOMATED COUNT: 13.4 % (ref 11.3–14.5)
GFR SERPL CREATININE-BSD FRML MDRD: 134 ML/MIN/1.73
GFR SERPL CREATININE-BSD FRML MDRD: 96 ML/MIN/1.73
GLUCOSE BLD-MCNC: 104 MG/DL (ref 70–100)
GLUCOSE BLD-MCNC: 98 MG/DL (ref 70–100)
HCT VFR BLD AUTO: 35.1 % (ref 38.9–50.9)
HGB BLD-MCNC: 11.8 G/DL (ref 13.1–17.5)
IMM GRANULOCYTES # BLD: 0.05 10*3/MM3 (ref 0–0.03)
IMM GRANULOCYTES NFR BLD: 0.4 % (ref 0–0.6)
LYMPHOCYTES # BLD AUTO: 2.04 10*3/MM3 (ref 0.6–4.8)
LYMPHOCYTES NFR BLD AUTO: 15.3 % (ref 24–44)
MAGNESIUM SERPL-MCNC: 2.5 MG/DL (ref 1.3–2.7)
MCH RBC QN AUTO: 29.6 PG (ref 27–31)
MCHC RBC AUTO-ENTMCNC: 33.6 G/DL (ref 32–36)
MCV RBC AUTO: 88 FL (ref 80–99)
MONOCYTES # BLD AUTO: 1.06 10*3/MM3 (ref 0–1)
MONOCYTES NFR BLD AUTO: 7.9 % (ref 0–12)
NEUTROPHILS # BLD AUTO: 10.07 10*3/MM3 (ref 1.5–8.3)
NEUTROPHILS NFR BLD AUTO: 75.5 % (ref 41–71)
PHOSPHATE SERPL-MCNC: 2.8 MG/DL (ref 2.4–5.1)
PLATELET # BLD AUTO: 226 10*3/MM3 (ref 150–450)
PMV BLD AUTO: 10.1 FL (ref 6–12)
POTASSIUM BLD-SCNC: 4 MMOL/L (ref 3.5–5.5)
POTASSIUM BLD-SCNC: 4.2 MMOL/L (ref 3.5–5.5)
RBC # BLD AUTO: 3.99 10*6/MM3 (ref 4.2–5.76)
SODIUM BLD-SCNC: 120 MMOL/L (ref 132–146)
SODIUM BLD-SCNC: 120 MMOL/L (ref 132–146)
SODIUM BLD-SCNC: 122 MMOL/L (ref 132–146)
SODIUM BLD-SCNC: 123 MMOL/L (ref 132–146)
SODIUM BLD-SCNC: 125 MMOL/L (ref 132–146)
SODIUM BLD-SCNC: 126 MMOL/L (ref 132–146)
SODIUM BLD-SCNC: 127 MMOL/L (ref 132–146)
SODIUM BLD-SCNC: 127 MMOL/L (ref 132–146)
SODIUM BLD-SCNC: 129 MMOL/L (ref 132–146)
WBC NRBC COR # BLD: 13.35 10*3/MM3 (ref 3.5–10.8)

## 2017-07-22 PROCEDURE — 94760 N-INVAS EAR/PLS OXIMETRY 1: CPT

## 2017-07-22 PROCEDURE — 94640 AIRWAY INHALATION TREATMENT: CPT

## 2017-07-22 PROCEDURE — 94799 UNLISTED PULMONARY SVC/PX: CPT

## 2017-07-22 PROCEDURE — 25010000002 HEPARIN (PORCINE) PER 1000 UNITS: Performed by: INTERNAL MEDICINE

## 2017-07-22 PROCEDURE — 84295 ASSAY OF SERUM SODIUM: CPT | Performed by: INTERNAL MEDICINE

## 2017-07-22 PROCEDURE — 80048 BASIC METABOLIC PNL TOTAL CA: CPT | Performed by: INTERNAL MEDICINE

## 2017-07-22 PROCEDURE — 85025 COMPLETE CBC W/AUTO DIFF WBC: CPT | Performed by: INTERNAL MEDICINE

## 2017-07-22 PROCEDURE — 25010000002 THIAMINE PER 100 MG: Performed by: INTERNAL MEDICINE

## 2017-07-22 PROCEDURE — 83735 ASSAY OF MAGNESIUM: CPT | Performed by: INTERNAL MEDICINE

## 2017-07-22 PROCEDURE — 99291 CRITICAL CARE FIRST HOUR: CPT | Performed by: INTERNAL MEDICINE

## 2017-07-22 PROCEDURE — 25010000002 DESMOPRESSIN PER 1 MCG: Performed by: NURSE PRACTITIONER

## 2017-07-22 PROCEDURE — 84100 ASSAY OF PHOSPHORUS: CPT | Performed by: INTERNAL MEDICINE

## 2017-07-22 RX ORDER — DESMOPRESSIN ACETATE 4 UG/ML
2 INJECTION, SOLUTION INTRAVENOUS; SUBCUTANEOUS ONCE
Status: DISCONTINUED | OUTPATIENT
Start: 2017-07-22 | End: 2017-07-22 | Stop reason: SDUPTHER

## 2017-07-22 RX ORDER — ACETAMINOPHEN 325 MG/1
650 TABLET ORAL EVERY 6 HOURS PRN
Status: DISCONTINUED | OUTPATIENT
Start: 2017-07-22 | End: 2017-07-25 | Stop reason: HOSPADM

## 2017-07-22 RX ADMIN — IPRATROPIUM BROMIDE AND ALBUTEROL SULFATE 3 ML: .5; 3 SOLUTION RESPIRATORY (INHALATION) at 13:19

## 2017-07-22 RX ADMIN — IPRATROPIUM BROMIDE AND ALBUTEROL SULFATE 3 ML: .5; 3 SOLUTION RESPIRATORY (INHALATION) at 09:20

## 2017-07-22 RX ADMIN — HEPARIN SODIUM 5000 UNITS: 5000 INJECTION, SOLUTION INTRAVENOUS; SUBCUTANEOUS at 05:14

## 2017-07-22 RX ADMIN — LISINOPRIL 20 MG: 20 TABLET ORAL at 08:18

## 2017-07-22 RX ADMIN — THIAMINE HYDROCHLORIDE 500 MG: 100 INJECTION, SOLUTION INTRAMUSCULAR; INTRAVENOUS at 22:00

## 2017-07-22 RX ADMIN — DESMOPRESSIN ACETATE 2 MCG: 4 SOLUTION INTRAVENOUS at 03:48

## 2017-07-22 RX ADMIN — HEPARIN SODIUM 5000 UNITS: 5000 INJECTION, SOLUTION INTRAVENOUS; SUBCUTANEOUS at 13:33

## 2017-07-22 RX ADMIN — THIAMINE HYDROCHLORIDE 500 MG: 100 INJECTION, SOLUTION INTRAMUSCULAR; INTRAVENOUS at 05:14

## 2017-07-22 RX ADMIN — ATORVASTATIN CALCIUM 20 MG: 20 TABLET, FILM COATED ORAL at 21:39

## 2017-07-22 RX ADMIN — PANTOPRAZOLE SODIUM 40 MG: 40 TABLET, DELAYED RELEASE ORAL at 05:14

## 2017-07-22 RX ADMIN — HEPARIN SODIUM 5000 UNITS: 5000 INJECTION, SOLUTION INTRAVENOUS; SUBCUTANEOUS at 21:39

## 2017-07-22 RX ADMIN — DEXTROSE MONOHYDRATE 150 ML/HR: 50 INJECTION, SOLUTION INTRAVENOUS at 08:28

## 2017-07-22 RX ADMIN — ACETAMINOPHEN 650 MG: 325 TABLET, FILM COATED ORAL at 14:33

## 2017-07-22 RX ADMIN — METOPROLOL TARTRATE 25 MG: 25 TABLET ORAL at 22:00

## 2017-07-22 RX ADMIN — DEXTROSE MONOHYDRATE 100 ML/HR: 50 INJECTION, SOLUTION INTRAVENOUS at 01:54

## 2017-07-22 RX ADMIN — ASPIRIN 81 MG CHEWABLE TABLET 81 MG: 81 TABLET CHEWABLE at 08:19

## 2017-07-22 RX ADMIN — FOLIC ACID 1 MG: 5 INJECTION, SOLUTION INTRAMUSCULAR; INTRAVENOUS; SUBCUTANEOUS at 08:18

## 2017-07-22 RX ADMIN — IPRATROPIUM BROMIDE AND ALBUTEROL SULFATE 3 ML: .5; 3 SOLUTION RESPIRATORY (INHALATION) at 19:31

## 2017-07-22 RX ADMIN — THIAMINE HYDROCHLORIDE 500 MG: 100 INJECTION, SOLUTION INTRAMUSCULAR; INTRAVENOUS at 15:46

## 2017-07-22 NOTE — PROGRESS NOTES
Patient's sodium continues to climb.  Most recent sodium was 130.  Patient was given DDAVP.  He is getting a bolus of D5 and increased rate.  Recheck sodium in 2 hours.  I discussed with nurse and night team.  Goal to get sodium down to about 120 before letting it gently rise.

## 2017-07-22 NOTE — SIGNIFICANT NOTE
Na+ had climbed to 130 (see Dr. Harrington's note), was responding to therapy w/ subsequent readings 127 & 126.  Reading at 0200 was 129.  Will give additional DDAVP IV and increase D5W infusion.  Continue to monitor.

## 2017-07-22 NOTE — PLAN OF CARE
Problem: Patient Care Overview (Adult)  Goal: Plan of Care Review    07/22/17 0338   Coping/Psychosocial Response Interventions   Plan Of Care Reviewed With patient   Patient Care Overview   Progress no change   Outcome Evaluation   Outcome Summary/Follow up Plan NA LEVEL UP AND DOWN BETWEEN 125-130. 500 CC D5W BOLUS GIVEN, FLUIDS ADJUSTED. 2 MCG DDAVP GIVEN. VITALS STABLE, NO COMPLAINTS OF PAIN, NO SEIZURE ACTIVITY       Goal: Adult Individualization and Mutuality  Outcome: Ongoing (interventions implemented as appropriate)  Goal: Discharge Needs Assessment  Outcome: Ongoing (interventions implemented as appropriate)    Problem: Pressure Ulcer Risk (Mayank Scale) (Adult,Obstetrics,Pediatric)  Goal: Skin Integrity  Outcome: Ongoing (interventions implemented as appropriate)    Problem: Nutrition, Enteral (Adult)  Goal: Signs and Symptoms of Listed Potential Problems Will be Absent or Manageable (Nutrition, Enteral)  Outcome: Ongoing (interventions implemented as appropriate)    Problem: Seizure Disorder/Epilepsy (Adult)  Goal: Signs and Symptoms of Listed Potential Problems Will be Absent or Manageable (Seizure Disorder/Epilepsy)  Outcome: Ongoing (interventions implemented as appropriate)

## 2017-07-22 NOTE — PLAN OF CARE
Problem: Patient Care Overview (Adult)  Goal: Plan of Care Review  Outcome: Ongoing (interventions implemented as appropriate)    07/22/17 1710   Coping/Psychosocial Response Interventions   Plan Of Care Reviewed With patient   Patient Care Overview   Progress progress toward functional goals is gradual   Outcome Evaluation   Outcome Summary/Follow up Plan did not sleep well overnight; Na remains variable, 120 at 1500- d5 dc'd; no longer checking Na q2, q6 met. panel; HA-tylenol ordered         Problem: Pressure Ulcer Risk (Mayank Scale) (Adult,Obstetrics,Pediatric)  Goal: Skin Integrity  Outcome: Ongoing (interventions implemented as appropriate)    Problem: Nutrition, Enteral (Adult)  Goal: Signs and Symptoms of Listed Potential Problems Will be Absent or Manageable (Nutrition, Enteral)  Outcome: Ongoing (interventions implemented as appropriate)    Problem: Seizure Disorder/Epilepsy (Adult)  Goal: Signs and Symptoms of Listed Potential Problems Will be Absent or Manageable (Seizure Disorder/Epilepsy)  Outcome: Ongoing (interventions implemented as appropriate)

## 2017-07-22 NOTE — PROGRESS NOTES
INTENSIVIST   PROGRESS NOTE     Hospital:  LOS: 2 days     Mr. Manuelito Harris, 71 y.o. male is followed for:    Hyponatremia.    As an Intensivist, we provide an integrated approach to the ICU patient and family, medical management of comorbid conditions, lead interdisciplinary rounds and coordinate the care with all other services, including those from other specialists.     Subjective   S     Interval History:  No complaints.  Better.  He wonders about his sodium being low.  Breathing better.     The patient's relevant past medical, surgical and social history were reviewed and updated in Epic as appropriate.      ROS:   No fevers.  No chest pain.  No dyspnea.  No nausea, vomiting or diarrhea.    Objective   O     Vitals:    Temp  Min: 97.6 °F (36.4 °C)  Max: 98.4 °F (36.9 °C)  BP  Min: 90/65  Max: 157/72  Pulse  Min: 55  Max: 88  Resp  Min: 16  Max: 20  SpO2  Min: 91 %  Max: 97 % Flow (L/min)  Min: 2  Max: 4    Intake/Ouptut 24 hrs (7:00AM - 6:59 AM)  Intake & Output (last 3 days)       07/19 0701 - 07/20 0700 07/20 0701 - 07/21 0700 07/21 0701 - 07/22 0700 07/22 0701 - 07/23 0700    P.O.  240 1620 1080    I.V. (mL/kg)  891.1 (10.1) 3264 (37.1) 1420 (16.1)    IV Piggyback  100 500 50    Total Intake(mL/kg)  1231.1 (14) 5384 (61.2) 2550 (29)    Urine (mL/kg/hr)  1600 6525 (3.1) 850 (1.1)    Total Output   1600 6525 850    Net   -368.9 -1141 +1700                    Physical Examination  Telemetry:  Sinus Rhythm: normal sinus rhythm   Constitutional:  No acute distress.   HEENT: Normocephalic and atraumatic.   Cardiovascular: Normal rate, regular and rhythm. Normal heart sounds.  No murmurs, gallop or rub.  No peripheral edema.   Respiratory: No respiratory distress. Normal respiratory effort.  Normal breath sounds  Clear to ascultation and percussion.    Abdominal:  Soft. No masses. Non-tender. No distension. No HSM.   Extremities: No digital cyanosis. No clubbing.   Neurological:   Alert and Oriented to person,  place, and time.  Best Eye Response: 4-->(E4) spontaneous  Best Motor Response: 6-->(M6) obeys commands  Best Verbal Response: 5-->(V5) oriented  Anju Coma Scale Score: 15   Psychiatric:  Normal affect. Normal behavior.   Lines/Drains/Airways: Peripherals       Results from last 7 days  Lab Units 07/22/17  0404 07/21/17  0845 07/20/17  2227   WBC 10*3/mm3 13.35* 10.46 10.00   HEMOGLOBIN g/dL 11.8* 12.8* 12.5*   MCV fL 88.0 84.1 81.2   PLATELETS 10*3/mm3 226 245 218       Results from last 7 days  Lab Units 07/22/17  1416 07/22/17  1149 07/22/17  0957  07/22/17  0404  07/21/17  1347 07/21/17  1148  07/21/17  0845  07/20/17 2227   SODIUM mmol/L 120* 123* 122*  < > 127*  127*  < > 124* 125*  125*  < > 121*  < > 116*   POTASSIUM mmol/L  --   --   --   --  4.0  --  4.9 4.4  < > 4.1  < > 4.0   CO2 mmol/L  --   --   --   --  30.0  --  27.0 36.0*  < > 27.0  < > 27.0   CREATININE mg/dL  --   --   --   --  0.60  --  0.80 0.70  < > 0.80  < > 0.70   MAGNESIUM mg/dL  --   --   --   --  2.5  --   --  3.4*  --  3.5*  --  1.9   PHOSPHORUS mg/dL  --   --   --   --  2.8  --   --   --   --  2.6  --  2.5   < > = values in this interval not displayed.  Estimated Creatinine Clearance: 97.4 mL/min (by C-G formula based on Cr of 0.6).    Results from last 7 days  Lab Units 07/20/17 2227   ALK PHOS U/L 64   BILIRUBIN mg/dL 1.0   ALT (SGPT) U/L 32   AST (SGOT) U/L 50*       Results: Reviewed.  I reviewed the patient's new laboratory and imaging results.  I independently reviewed the patient's new images.    Medications: Reviewed.    Assessment/Plan   A / P         71 y.o.male, admitted on 7/20/2017 with Hyponatremia [E87.1]:     1. Hyponatremia  1. Desmopressin: 7/21 17 HS (SQ)  7/22 3 AM (IV)  2. Tolvaptan: 7/21 3 AM     Lab Results   Component Value Date     (L) 07/22/2017     (L) 07/22/2017     (L) 07/22/2017     (L) 07/22/2017     (L) 07/22/2017     (L) 07/22/2017     (L) 07/22/2017    NA  126 (L) 07/22/2017     (L) 07/21/2017     (L) 07/21/2017     (L) 07/21/2017     (L) 07/21/2017     (L) 07/21/2017     (L) 07/21/2017     (L) 07/21/2017     (C) 07/21/2017     (C) 07/21/2017     (L) 07/21/2017     (C) 07/21/2017     (C) 07/20/2017       2. Seizures secondary to Hyponatremia.  3. COPD  4. REANNA on CPAP at home.  5. HTN  6. GERD  7. Dyslipidemia  8. H/o ETOH    Nutrition:   Diet Regular; Consistent Carbohydrate, Cardiac  Advance Directives: Full Code    Assessment / Plan:    1. Decrease frequency of Na  2. Decrease IVF  3. Re-assess of medicine for hyponatremia for now.   4. May resume Samsca, he only got one dose this admission.    Plan of care and goals reviewed during interdisciplinary rounds.  I discussed the patient's findings and my recommendations with patient and nursing staff    Time:   Critical Care Time: was greater than 35 minutes.(excluding time spent on other separately billable procedures or treating other patients).   Patient was at high risk for life-threatening deterioration due to severe hyponatremia.     Rios Monsalve MD, FACP, FCCP, [facility]    Intensive Care Medicine and Pulmonary Medicine

## 2017-07-23 LAB
ALBUMIN SERPL-MCNC: 3.7 G/DL (ref 3.2–4.8)
ALP SERPL-CCNC: 54 U/L (ref 25–100)
ALT SERPL W P-5'-P-CCNC: 25 U/L (ref 7–40)
ANION GAP SERPL CALCULATED.3IONS-SCNC: 3 MMOL/L (ref 3–11)
ANION GAP SERPL CALCULATED.3IONS-SCNC: 4 MMOL/L (ref 3–11)
ANION GAP SERPL CALCULATED.3IONS-SCNC: 5 MMOL/L (ref 3–11)
AST SERPL-CCNC: 17 U/L (ref 0–33)
BASOPHILS # BLD AUTO: 0.05 10*3/MM3 (ref 0–0.2)
BASOPHILS NFR BLD AUTO: 0.5 % (ref 0–1)
BILIRUB CONJ SERPL-MCNC: 0.1 MG/DL (ref 0–0.2)
BILIRUB INDIRECT SERPL-MCNC: 0.2 MG/DL (ref 0.1–1.1)
BILIRUB SERPL-MCNC: 0.3 MG/DL (ref 0.3–1.2)
BUN BLD-MCNC: 10 MG/DL (ref 9–23)
BUN BLD-MCNC: 12 MG/DL (ref 9–23)
BUN BLD-MCNC: 8 MG/DL (ref 9–23)
BUN BLD-MCNC: 9 MG/DL (ref 9–23)
BUN BLD-MCNC: 9 MG/DL (ref 9–23)
BUN/CREAT SERPL: 11.3 (ref 7–25)
BUN/CREAT SERPL: 11.4 (ref 7–25)
BUN/CREAT SERPL: 12.5 (ref 7–25)
BUN/CREAT SERPL: 12.9 (ref 7–25)
BUN/CREAT SERPL: 17.1 (ref 7–25)
CALCIUM SPEC-SCNC: 8.4 MG/DL (ref 8.7–10.4)
CALCIUM SPEC-SCNC: 8.7 MG/DL (ref 8.7–10.4)
CALCIUM SPEC-SCNC: 8.9 MG/DL (ref 8.7–10.4)
CALCIUM SPEC-SCNC: 9 MG/DL (ref 8.7–10.4)
CALCIUM SPEC-SCNC: 9.2 MG/DL (ref 8.7–10.4)
CHLORIDE SERPL-SCNC: 87 MMOL/L (ref 99–109)
CHLORIDE SERPL-SCNC: 88 MMOL/L (ref 99–109)
CHLORIDE SERPL-SCNC: 92 MMOL/L (ref 99–109)
CHLORIDE SERPL-SCNC: 93 MMOL/L (ref 99–109)
CHLORIDE SERPL-SCNC: 93 MMOL/L (ref 99–109)
CO2 SERPL-SCNC: 30 MMOL/L (ref 20–31)
CO2 SERPL-SCNC: 32 MMOL/L (ref 20–31)
CO2 SERPL-SCNC: 33 MMOL/L (ref 20–31)
CO2 SERPL-SCNC: 33 MMOL/L (ref 20–31)
CO2 SERPL-SCNC: 35 MMOL/L (ref 20–31)
CREAT BLD-MCNC: 0.7 MG/DL (ref 0.6–1.3)
CREAT BLD-MCNC: 0.8 MG/DL (ref 0.6–1.3)
CREAT BLD-MCNC: 0.8 MG/DL (ref 0.6–1.3)
DEPRECATED RDW RBC AUTO: 41.8 FL (ref 37–54)
EOSINOPHIL # BLD AUTO: 0.86 10*3/MM3 (ref 0–0.3)
EOSINOPHIL NFR BLD AUTO: 8 % (ref 0–3)
ERYTHROCYTE [DISTWIDTH] IN BLOOD BY AUTOMATED COUNT: 13.4 % (ref 11.3–14.5)
GFR SERPL CREATININE-BSD FRML MDRD: 112 ML/MIN/1.73
GFR SERPL CREATININE-BSD FRML MDRD: 96 ML/MIN/1.73
GFR SERPL CREATININE-BSD FRML MDRD: 96 ML/MIN/1.73
GLUCOSE BLD-MCNC: 117 MG/DL (ref 70–100)
GLUCOSE BLD-MCNC: 127 MG/DL (ref 70–100)
GLUCOSE BLD-MCNC: 73 MG/DL (ref 70–100)
GLUCOSE BLD-MCNC: 94 MG/DL (ref 70–100)
GLUCOSE BLD-MCNC: 96 MG/DL (ref 70–100)
HCT VFR BLD AUTO: 33.6 % (ref 38.9–50.9)
HGB BLD-MCNC: 11.4 G/DL (ref 13.1–17.5)
IMM GRANULOCYTES # BLD: 0.05 10*3/MM3 (ref 0–0.03)
IMM GRANULOCYTES NFR BLD: 0.5 % (ref 0–0.6)
LYMPHOCYTES # BLD AUTO: 2.42 10*3/MM3 (ref 0.6–4.8)
LYMPHOCYTES NFR BLD AUTO: 22.4 % (ref 24–44)
MAGNESIUM SERPL-MCNC: 2.2 MG/DL (ref 1.3–2.7)
MCH RBC QN AUTO: 29.2 PG (ref 27–31)
MCHC RBC AUTO-ENTMCNC: 33.9 G/DL (ref 32–36)
MCV RBC AUTO: 85.9 FL (ref 80–99)
MONOCYTES # BLD AUTO: 1.17 10*3/MM3 (ref 0–1)
MONOCYTES NFR BLD AUTO: 10.8 % (ref 0–12)
NEUTROPHILS # BLD AUTO: 6.26 10*3/MM3 (ref 1.5–8.3)
NEUTROPHILS NFR BLD AUTO: 57.8 % (ref 41–71)
PLATELET # BLD AUTO: 225 10*3/MM3 (ref 150–450)
PMV BLD AUTO: 10.2 FL (ref 6–12)
POTASSIUM BLD-SCNC: 3.9 MMOL/L (ref 3.5–5.5)
POTASSIUM BLD-SCNC: 4.1 MMOL/L (ref 3.5–5.5)
POTASSIUM BLD-SCNC: 4.2 MMOL/L (ref 3.5–5.5)
POTASSIUM BLD-SCNC: 4.5 MMOL/L (ref 3.5–5.5)
POTASSIUM BLD-SCNC: 5 MMOL/L (ref 3.5–5.5)
PROT SERPL-MCNC: 6.2 G/DL (ref 5.7–8.2)
RBC # BLD AUTO: 3.91 10*6/MM3 (ref 4.2–5.76)
SODIUM BLD-SCNC: 122 MMOL/L (ref 132–146)
SODIUM BLD-SCNC: 126 MMOL/L (ref 132–146)
SODIUM BLD-SCNC: 127 MMOL/L (ref 132–146)
SODIUM BLD-SCNC: 131 MMOL/L (ref 132–146)
SODIUM BLD-SCNC: 132 MMOL/L (ref 132–146)
WBC NRBC COR # BLD: 10.81 10*3/MM3 (ref 3.5–10.8)

## 2017-07-23 PROCEDURE — 80048 BASIC METABOLIC PNL TOTAL CA: CPT | Performed by: INTERNAL MEDICINE

## 2017-07-23 PROCEDURE — 99232 SBSQ HOSP IP/OBS MODERATE 35: CPT | Performed by: INTERNAL MEDICINE

## 2017-07-23 PROCEDURE — 94640 AIRWAY INHALATION TREATMENT: CPT

## 2017-07-23 PROCEDURE — 94760 N-INVAS EAR/PLS OXIMETRY 1: CPT

## 2017-07-23 PROCEDURE — 85025 COMPLETE CBC W/AUTO DIFF WBC: CPT | Performed by: INTERNAL MEDICINE

## 2017-07-23 PROCEDURE — 94660 CPAP INITIATION&MGMT: CPT

## 2017-07-23 PROCEDURE — 94799 UNLISTED PULMONARY SVC/PX: CPT

## 2017-07-23 PROCEDURE — 80076 HEPATIC FUNCTION PANEL: CPT | Performed by: INTERNAL MEDICINE

## 2017-07-23 PROCEDURE — 25010000002 HEPARIN (PORCINE) PER 1000 UNITS: Performed by: INTERNAL MEDICINE

## 2017-07-23 PROCEDURE — 83735 ASSAY OF MAGNESIUM: CPT | Performed by: INTERNAL MEDICINE

## 2017-07-23 PROCEDURE — 25010000002 THIAMINE PER 100 MG: Performed by: INTERNAL MEDICINE

## 2017-07-23 RX ORDER — METOPROLOL TARTRATE 50 MG/1
50 TABLET, FILM COATED ORAL EVERY MORNING
Status: DISCONTINUED | OUTPATIENT
Start: 2017-07-23 | End: 2017-07-25 | Stop reason: HOSPADM

## 2017-07-23 RX ADMIN — FOLIC ACID 1 MG: 5 INJECTION, SOLUTION INTRAMUSCULAR; INTRAVENOUS; SUBCUTANEOUS at 09:55

## 2017-07-23 RX ADMIN — IPRATROPIUM BROMIDE AND ALBUTEROL SULFATE 3 ML: .5; 3 SOLUTION RESPIRATORY (INHALATION) at 13:23

## 2017-07-23 RX ADMIN — ASPIRIN 81 MG CHEWABLE TABLET 81 MG: 81 TABLET CHEWABLE at 09:55

## 2017-07-23 RX ADMIN — HEPARIN SODIUM 5000 UNITS: 5000 INJECTION, SOLUTION INTRAVENOUS; SUBCUTANEOUS at 05:54

## 2017-07-23 RX ADMIN — METOPROLOL TARTRATE 50 MG: 50 TABLET, FILM COATED ORAL at 09:56

## 2017-07-23 RX ADMIN — ATORVASTATIN CALCIUM 20 MG: 20 TABLET, FILM COATED ORAL at 21:58

## 2017-07-23 RX ADMIN — HEPARIN SODIUM 5000 UNITS: 5000 INJECTION, SOLUTION INTRAVENOUS; SUBCUTANEOUS at 14:35

## 2017-07-23 RX ADMIN — THIAMINE HYDROCHLORIDE 500 MG: 100 INJECTION, SOLUTION INTRAMUSCULAR; INTRAVENOUS at 06:24

## 2017-07-23 RX ADMIN — HEPARIN SODIUM 5000 UNITS: 5000 INJECTION, SOLUTION INTRAVENOUS; SUBCUTANEOUS at 21:58

## 2017-07-23 RX ADMIN — LISINOPRIL 20 MG: 20 TABLET ORAL at 09:54

## 2017-07-23 RX ADMIN — IPRATROPIUM BROMIDE AND ALBUTEROL SULFATE 3 ML: .5; 3 SOLUTION RESPIRATORY (INHALATION) at 20:54

## 2017-07-23 RX ADMIN — IPRATROPIUM BROMIDE AND ALBUTEROL SULFATE 3 ML: .5; 3 SOLUTION RESPIRATORY (INHALATION) at 09:29

## 2017-07-23 RX ADMIN — THIAMINE HYDROCHLORIDE 500 MG: 100 INJECTION, SOLUTION INTRAMUSCULAR; INTRAVENOUS at 14:35

## 2017-07-23 RX ADMIN — IPRATROPIUM BROMIDE AND ALBUTEROL SULFATE 3 ML: .5; 3 SOLUTION RESPIRATORY (INHALATION) at 16:29

## 2017-07-23 RX ADMIN — METOPROLOL TARTRATE 25 MG: 25 TABLET ORAL at 21:58

## 2017-07-23 RX ADMIN — PANTOPRAZOLE SODIUM 40 MG: 40 TABLET, DELAYED RELEASE ORAL at 05:54

## 2017-07-23 NOTE — PLAN OF CARE
Problem: Patient Care Overview (Adult)  Goal: Plan of Care Review  Outcome: Ongoing (interventions implemented as appropriate)    07/23/17 1875   Coping/Psychosocial Response Interventions   Plan Of Care Reviewed With patient   Patient Care Overview   Progress improving   Outcome Evaluation   Outcome Summary/Follow up Plan Dr Monsalve trying to allow pt sodium to stabilize through pt intake/less intervention r/t inability to stabilize with intervention yesterday; up to chair few several hours/steady gait; vitals stable, no pain/complaints         Problem: Pressure Ulcer Risk (Mayank Scale) (Adult,Obstetrics,Pediatric)  Goal: Skin Integrity  Outcome: Ongoing (interventions implemented as appropriate)    Problem: Nutrition, Enteral (Adult)  Goal: Signs and Symptoms of Listed Potential Problems Will be Absent or Manageable (Nutrition, Enteral)  Outcome: Ongoing (interventions implemented as appropriate)    Problem: Seizure Disorder/Epilepsy (Adult)  Goal: Signs and Symptoms of Listed Potential Problems Will be Absent or Manageable (Seizure Disorder/Epilepsy)  Outcome: Ongoing (interventions implemented as appropriate)

## 2017-07-23 NOTE — PLAN OF CARE
Problem: Pressure Ulcer Risk (Mayank Scale) (Adult,Obstetrics,Pediatric)  Goal: Skin Integrity  Outcome: Ongoing (interventions implemented as appropriate)    07/23/17 0435   Pressure Ulcer Risk (Mayank Scale) (Adult,Obstetrics,Pediatric)   Skin Integrity making progress toward outcome         Problem: Seizure Disorder/Epilepsy (Adult)  Goal: Signs and Symptoms of Listed Potential Problems Will be Absent or Manageable (Seizure Disorder/Epilepsy)  Outcome: Ongoing (interventions implemented as appropriate)    07/23/17 0435   Seizure Disorder/Epilepsy   Problems Assessed (Seizure Disorder/Epilepsy) all   Problems Present (Seizure Disorder/Epilepsy) none

## 2017-07-23 NOTE — PROGRESS NOTES
INTENSIVIST   PROGRESS NOTE     Hospital:  LOS: 3 days     Mr. Manuelito Harris, 71 y.o. male is followed for:    Hyponatremia.    As an Intensivist, we provide an integrated approach to the ICU patient and family, medical management of comorbid conditions, lead interdisciplinary rounds and coordinate the care with all other services, including those from other specialists.     Subjective   S     Interval History:  Using his CPAP during night.  No respiratory distress.  Breathing much better.  No pain  Improving.     The patient's relevant past medical, surgical and social history were reviewed and updated in Epic as appropriate.      ROS:   No fevers.  No chest pain.  No dyspnea.  No nausea, vomiting or diarrhea.    Objective   O     Vitals:    Temp  Min: 97.8 °F (36.6 °C)  Max: 98.4 °F (36.9 °C)  BP  Min: 110/65  Max: 149/78  Pulse  Min: 53  Max: 80  Resp  Min: 14  Max: 18  SpO2  Min: 90 %  Max: 97 % Flow (L/min)  Min: 2  Max: 4    Intake/Ouptut 24 hrs (7:00AM - 6:59 AM)  Intake & Output (last 3 days)       07/20 0701 - 07/21 0700 07/21 0701 - 07/22 0700 07/22 0701 - 07/23 0700 07/23 0701 - 07/24 0700    P.O. 240 1620 1820 960    I.V. (mL/kg) 891.1 (10.1) 3264 (37.1) 1550.6 (17.6)     IV Piggyback 100 500 250     Total Intake(mL/kg) 1231.1 (14) 5384 (61.2) 3620.6 (41.1) 960 (10.9)    Urine (mL/kg/hr) 1600 6525 (3.1) 5325 (2.5) 2280 (2.4)    Stool    0 (0)    Total Output 1600 6525 5325 2280    Net -368.9 -1141 -1704.4 -1320            Unmeasured Urine Occurrence    1 x    Unmeasured Stool Occurrence    1 x            Physical Examination  Telemetry:  Sinus Rhythm: normal sinus rhythm   Constitutional:  No acute distress.   HEENT: Normocephalic and atraumatic.   Cardiovascular: Normal rate, regular and rhythm. Normal heart sounds.  No murmurs, gallop or rub.  No peripheral edema.   Respiratory: No respiratory distress. Normal respiratory effort.  Normal breath sounds  Clear to ascultation and percussion.    Abdominal:   Soft. No masses. Non-tender. No distension. No HSM.   Extremities: No digital cyanosis. No clubbing.   Neurological:   Alert and Oriented to person, place, and time.  Best Eye Response: 4-->(E4) spontaneous  Best Motor Response: 6-->(M6) obeys commands  Best Verbal Response: 5-->(V5) oriented  Anju Coma Scale Score: 15   Psychiatric:  Normal affect. Normal behavior.   Lines/Drains/Airways: Peripherals       Results from last 7 days  Lab Units 07/23/17  0312 07/22/17  0404 07/21/17  0845   WBC 10*3/mm3 10.81* 13.35* 10.46   HEMOGLOBIN g/dL 11.4* 11.8* 12.8*   MCV fL 85.9 88.0 84.1   PLATELETS 10*3/mm3 225 226 245       Results from last 7 days  Lab Units 07/23/17  1543 07/23/17  0959 07/23/17  0312  07/22/17  0404  07/21/17  1148  07/21/17  0845  07/20/17  2227   SODIUM mmol/L 131* 127* 126*  < > 127*  127*  < > 125*  125*  < > 121*  < > 116*   POTASSIUM mmol/L 5.0 4.2 3.9  < > 4.0  < > 4.4  < > 4.1  < > 4.0   CO2 mmol/L 33.0* 30.0 33.0*  < > 30.0  < > 36.0*  < > 27.0  < > 27.0   CREATININE mg/dL 0.70 0.70 0.70  < > 0.60  < > 0.70  < > 0.80  < > 0.70   MAGNESIUM mg/dL  --   --  2.2  --  2.5  --  3.4*  --  3.5*  --  1.9   PHOSPHORUS mg/dL  --   --   --   --  2.8  --   --   --  2.6  --  2.5   < > = values in this interval not displayed.  Estimated Creatinine Clearance: 97.4 mL/min (by C-G formula based on Cr of 0.7).    Results from last 7 days  Lab Units 07/23/17 0312 07/20/17  2227   ALK PHOS U/L 54 64   BILIRUBIN mg/dL 0.3 1.0   BILIRUBIN DIRECT mg/dL 0.1  --    ALT (SGPT) U/L 25 32   AST (SGOT) U/L 17 50*       Results: Reviewed.  I reviewed the patient's new laboratory and imaging results.  I independently reviewed the patient's new images.    Medications: Reviewed.    Assessment/Plan   A / P         71 y.o.male, admitted on 7/20/2017 with Hyponatremia [E87.1]:     1. Hyponatremia: Improving  1. Desmopressin: 7/21 17 HS (SQ)  7/22 3 AM (IV)  2. Tolvaptan: 7/21 3 AM     Lab Results   Component Value Date      (L) 07/23/2017     (L) 07/23/2017     (L) 07/23/2017     (L) 07/23/2017     (L) 07/22/2017     (L) 07/22/2017     (L) 07/22/2017     (L) 07/22/2017     (L) 07/22/2017     (L) 07/22/2017     (L) 07/22/2017     (L) 07/22/2017     (L) 07/22/2017       2. Seizures secondary to Hyponatremia.  3. COPD  4. REANNA on CPAP at home.  5. HTN  6. GERD  7. Dyslipidemia  8. H/o ETOH    Nutrition:   Diet Regular; Consistent Carbohydrate, Cardiac  Advance Directives: Full Code    Assessment / Plan:    1. Na up nicely. No need for samsa at this time  2. Probably floor in am in Na stable.    Plan of care and goals reviewed during interdisciplinary rounds.  I discussed the patient's findings and my recommendations with patient and nursing staff    Rios Monsalve MD, FACP, FCCP, [facility]    Intensive Care Medicine and Pulmonary Medicine

## 2017-07-24 LAB
ANION GAP SERPL CALCULATED.3IONS-SCNC: 8 MMOL/L (ref 3–11)
BASOPHILS # BLD AUTO: 0.06 10*3/MM3 (ref 0–0.2)
BASOPHILS NFR BLD AUTO: 0.5 % (ref 0–1)
BUN BLD-MCNC: 9 MG/DL (ref 9–23)
BUN/CREAT SERPL: 12.9 (ref 7–25)
CALCIUM SPEC-SCNC: 9.1 MG/DL (ref 8.7–10.4)
CHLORIDE SERPL-SCNC: 92 MMOL/L (ref 99–109)
CO2 SERPL-SCNC: 32 MMOL/L (ref 20–31)
CREAT BLD-MCNC: 0.7 MG/DL (ref 0.6–1.3)
DEPRECATED RDW RBC AUTO: 42.8 FL (ref 37–54)
EOSINOPHIL # BLD AUTO: 1.53 10*3/MM3 (ref 0–0.3)
EOSINOPHIL NFR BLD AUTO: 11.5 % (ref 0–3)
ERYTHROCYTE [DISTWIDTH] IN BLOOD BY AUTOMATED COUNT: 13.4 % (ref 11.3–14.5)
GFR SERPL CREATININE-BSD FRML MDRD: 112 ML/MIN/1.73
GLUCOSE BLD-MCNC: 94 MG/DL (ref 70–100)
HCT VFR BLD AUTO: 35.7 % (ref 38.9–50.9)
HGB BLD-MCNC: 12.1 G/DL (ref 13.1–17.5)
IMM GRANULOCYTES # BLD: 0.06 10*3/MM3 (ref 0–0.03)
IMM GRANULOCYTES NFR BLD: 0.5 % (ref 0–0.6)
LYMPHOCYTES # BLD AUTO: 2.6 10*3/MM3 (ref 0.6–4.8)
LYMPHOCYTES NFR BLD AUTO: 19.5 % (ref 24–44)
MAGNESIUM SERPL-MCNC: 2.1 MG/DL (ref 1.3–2.7)
MCH RBC QN AUTO: 29.5 PG (ref 27–31)
MCHC RBC AUTO-ENTMCNC: 33.9 G/DL (ref 32–36)
MCV RBC AUTO: 87.1 FL (ref 80–99)
MONOCYTES # BLD AUTO: 1.08 10*3/MM3 (ref 0–1)
MONOCYTES NFR BLD AUTO: 8.1 % (ref 0–12)
NEUTROPHILS # BLD AUTO: 7.99 10*3/MM3 (ref 1.5–8.3)
NEUTROPHILS NFR BLD AUTO: 59.9 % (ref 41–71)
PHOSPHATE SERPL-MCNC: 4.2 MG/DL (ref 2.4–5.1)
PLAT MORPH BLD: NORMAL
PLATELET # BLD AUTO: 254 10*3/MM3 (ref 150–450)
PMV BLD AUTO: 10.1 FL (ref 6–12)
POTASSIUM BLD-SCNC: 4.6 MMOL/L (ref 3.5–5.5)
RBC # BLD AUTO: 4.1 10*6/MM3 (ref 4.2–5.76)
RBC MORPH BLD: NORMAL
SODIUM BLD-SCNC: 132 MMOL/L (ref 132–146)
WBC MORPH BLD: NORMAL
WBC NRBC COR # BLD: 13.32 10*3/MM3 (ref 3.5–10.8)

## 2017-07-24 PROCEDURE — 85025 COMPLETE CBC W/AUTO DIFF WBC: CPT | Performed by: INTERNAL MEDICINE

## 2017-07-24 PROCEDURE — 94760 N-INVAS EAR/PLS OXIMETRY 1: CPT

## 2017-07-24 PROCEDURE — 94799 UNLISTED PULMONARY SVC/PX: CPT

## 2017-07-24 PROCEDURE — 94640 AIRWAY INHALATION TREATMENT: CPT

## 2017-07-24 PROCEDURE — 94660 CPAP INITIATION&MGMT: CPT

## 2017-07-24 PROCEDURE — 80048 BASIC METABOLIC PNL TOTAL CA: CPT | Performed by: INTERNAL MEDICINE

## 2017-07-24 PROCEDURE — 85007 BL SMEAR W/DIFF WBC COUNT: CPT | Performed by: INTERNAL MEDICINE

## 2017-07-24 PROCEDURE — 99232 SBSQ HOSP IP/OBS MODERATE 35: CPT | Performed by: INTERNAL MEDICINE

## 2017-07-24 PROCEDURE — 84100 ASSAY OF PHOSPHORUS: CPT | Performed by: INTERNAL MEDICINE

## 2017-07-24 PROCEDURE — 83735 ASSAY OF MAGNESIUM: CPT | Performed by: INTERNAL MEDICINE

## 2017-07-24 PROCEDURE — 25010000002 HEPARIN (PORCINE) PER 1000 UNITS: Performed by: INTERNAL MEDICINE

## 2017-07-24 RX ORDER — DOCUSATE SODIUM 100 MG/1
100 CAPSULE, LIQUID FILLED ORAL 2 TIMES DAILY
Status: DISCONTINUED | OUTPATIENT
Start: 2017-07-24 | End: 2017-07-25 | Stop reason: HOSPADM

## 2017-07-24 RX ORDER — THIAMINE MONONITRATE (VIT B1) 100 MG
100 TABLET ORAL DAILY
Status: DISCONTINUED | OUTPATIENT
Start: 2017-07-24 | End: 2017-07-25 | Stop reason: HOSPADM

## 2017-07-24 RX ORDER — FOLIC ACID 1 MG/1
1 TABLET ORAL DAILY
Status: DISCONTINUED | OUTPATIENT
Start: 2017-07-25 | End: 2017-07-25 | Stop reason: HOSPADM

## 2017-07-24 RX ORDER — DIPHENOXYLATE HYDROCHLORIDE AND ATROPINE SULFATE 2.5; .025 MG/1; MG/1
1 TABLET ORAL DAILY
Status: DISCONTINUED | OUTPATIENT
Start: 2017-07-24 | End: 2017-07-25 | Stop reason: HOSPADM

## 2017-07-24 RX ADMIN — LISINOPRIL 20 MG: 20 TABLET ORAL at 08:08

## 2017-07-24 RX ADMIN — ATORVASTATIN CALCIUM 20 MG: 20 TABLET, FILM COATED ORAL at 21:07

## 2017-07-24 RX ADMIN — Medication 100 MG: at 17:50

## 2017-07-24 RX ADMIN — IPRATROPIUM BROMIDE AND ALBUTEROL SULFATE 3 ML: .5; 3 SOLUTION RESPIRATORY (INHALATION) at 16:17

## 2017-07-24 RX ADMIN — FOLIC ACID 1 MG: 5 INJECTION, SOLUTION INTRAMUSCULAR; INTRAVENOUS; SUBCUTANEOUS at 08:08

## 2017-07-24 RX ADMIN — METOPROLOL TARTRATE 50 MG: 50 TABLET, FILM COATED ORAL at 08:08

## 2017-07-24 RX ADMIN — ACETAMINOPHEN 650 MG: 325 TABLET, FILM COATED ORAL at 14:35

## 2017-07-24 RX ADMIN — DOCUSATE SODIUM 100 MG: 100 CAPSULE, LIQUID FILLED ORAL at 17:50

## 2017-07-24 RX ADMIN — Medication 1 TABLET: at 17:50

## 2017-07-24 RX ADMIN — IPRATROPIUM BROMIDE AND ALBUTEROL SULFATE 3 ML: .5; 3 SOLUTION RESPIRATORY (INHALATION) at 08:08

## 2017-07-24 RX ADMIN — HEPARIN SODIUM 5000 UNITS: 5000 INJECTION, SOLUTION INTRAVENOUS; SUBCUTANEOUS at 06:40

## 2017-07-24 RX ADMIN — PANTOPRAZOLE SODIUM 40 MG: 40 TABLET, DELAYED RELEASE ORAL at 06:40

## 2017-07-24 RX ADMIN — ASPIRIN 81 MG CHEWABLE TABLET 81 MG: 81 TABLET CHEWABLE at 08:08

## 2017-07-24 RX ADMIN — IPRATROPIUM BROMIDE AND ALBUTEROL SULFATE 3 ML: .5; 3 SOLUTION RESPIRATORY (INHALATION) at 21:41

## 2017-07-24 RX ADMIN — IPRATROPIUM BROMIDE AND ALBUTEROL SULFATE 3 ML: .5; 3 SOLUTION RESPIRATORY (INHALATION) at 12:00

## 2017-07-24 RX ADMIN — HEPARIN SODIUM 5000 UNITS: 5000 INJECTION, SOLUTION INTRAVENOUS; SUBCUTANEOUS at 14:35

## 2017-07-24 RX ADMIN — HEPARIN SODIUM 5000 UNITS: 5000 INJECTION, SOLUTION INTRAVENOUS; SUBCUTANEOUS at 21:07

## 2017-07-24 NOTE — PLAN OF CARE
Problem: Patient Care Overview (Adult)  Goal: Plan of Care Review  Outcome: Ongoing (interventions implemented as appropriate)    07/23/17 1749 07/23/17 1800 07/24/17 0715   Coping/Psychosocial Response Interventions   Plan Of Care Reviewed With --  patient --    Patient Care Overview   Progress improving --  --    Outcome Evaluation   Outcome Summary/Follow up Plan --  --  na stable at 131/132, vss, ambulated very well last night       Goal: Adult Individualization and Mutuality  Outcome: Ongoing (interventions implemented as appropriate)  Goal: Discharge Needs Assessment  Outcome: Ongoing (interventions implemented as appropriate)    Problem: Pressure Ulcer Risk (Mayank Scale) (Adult,Obstetrics,Pediatric)  Goal: Skin Integrity  Outcome: Ongoing (interventions implemented as appropriate)    Problem: Nutrition, Enteral (Adult)  Goal: Signs and Symptoms of Listed Potential Problems Will be Absent or Manageable (Nutrition, Enteral)  Outcome: Ongoing (interventions implemented as appropriate)    Problem: Seizure Disorder/Epilepsy (Adult)  Goal: Signs and Symptoms of Listed Potential Problems Will be Absent or Manageable (Seizure Disorder/Epilepsy)  Outcome: Ongoing (interventions implemented as appropriate)

## 2017-07-24 NOTE — PROGRESS NOTES
Multidisciplinary Rounds    Time: 20min  Patient Name: Manuelito Harris  Date of Encounter: 07/24/17 12:02 PM  MRN: [id number]  Admission date: 7/20/2017      Reason for visit: MDR. RD to continue to follow per protocol.     Additional information obtained during MDR: Plans to move out of ICU.    Current diet: Diet Regular; Consistent Carbohydrate, Cardiac      Intervention:  Follow treatment plan  Care plan reviewed    Follow up:   Per protocol      Leilani Choudhury RDN, LD  12:02 PM

## 2017-07-24 NOTE — PLAN OF CARE
Problem: Patient Care Overview (Adult)  Goal: Plan of Care Review  Outcome: Ongoing (interventions implemented as appropriate)    07/23/17 1749 07/23/17 1800 07/24/17 1835   Coping/Psychosocial Response Interventions   Plan Of Care Reviewed With --  patient --    Patient Care Overview   Progress improving --  --    Outcome Evaluation   Outcome Summary/Follow up Plan --  --  Patient doing well. NA stable. Labs changed to daily. VSS. Ordered out to telemetry          Problem: Pressure Ulcer Risk (Mayank Scale) (Adult,Obstetrics,Pediatric)  Goal: Skin Integrity  Outcome: Ongoing (interventions implemented as appropriate)    Problem: Seizure Disorder/Epilepsy (Adult)  Goal: Signs and Symptoms of Listed Potential Problems Will be Absent or Manageable (Seizure Disorder/Epilepsy)  Outcome: Ongoing (interventions implemented as appropriate)

## 2017-07-24 NOTE — PROGRESS NOTES
Continued Stay Note  [facility]     Patient Name: Manuelito Harris  MRN: [id number]  Today's Date: 7/24/2017    Admit Date: 7/20/2017          Discharge Plan       07/24/17 1320    Case Management/Social Work Plan    Plan Home    Patient/Family In Agreement With Plan yes    Additional Comments Spoke with patient.  Patient is still planning to go home upon discharge.  Family to transport.  CM will continue to follow.              Discharge Codes     None        Expected Discharge Date and Time     Expected Discharge Date Expected Discharge Time    Jul 25, 2017             Flor Miles RN

## 2017-07-24 NOTE — PROGRESS NOTES
INTENSIVIST   PROGRESS NOTE     Hospital:  LOS: 4 days     Mr. Manuelito Harris, 71 y.o. male is followed for:    Hyponatremia.    As an Intensivist, we provide an integrated approach to the ICU patient and family, medical management of comorbid conditions, lead interdisciplinary rounds and coordinate the care with all other services, including those from other specialists.     Subjective   S     Interval History:  Using his CPAP during night.  Doing well.  No complains.     The patient's relevant past medical, surgical and social history were reviewed and updated in Epic as appropriate.      ROS:   No fevers.  No chest pain.  No dyspnea.  No nausea, vomiting or diarrhea.    Objective   O     Vitals:    Temp  Min: 97.8 °F (36.6 °C)  Max: 98.8 °F (37.1 °C)  BP  Min: 105/84  Max: 160/84  Pulse  Min: 55  Max: 77  Resp  Min: 18  Max: 24  SpO2  Min: 93 %  Max: 98 % Flow (L/min)  Min: 2  Max: 2    Intake/Ouptut 24 hrs (7:00AM - 6:59 AM)  Intake & Output (last 3 days)       07/21 0701 - 07/22 0700 07/22 0701 - 07/23 0700 07/23 0701 - 07/24 0700 07/24 0701 - 07/25 0700    P.O. 1620 1820 1560     I.V. (mL/kg) 3264 (37.1) 1550.6 (17.6) 100 (1.1)     IV Piggyback 500 250 50 50    Total Intake(mL/kg) 5384 (61.2) 3620.6 (41.1) 1710 (19.4) 50 (0.6)    Urine (mL/kg/hr) 6525 (3.1) 5325 (2.5) 5880 (2.8) 1500 (1.7)    Stool   0 (0)     Total Output 6525 5325 5880 1500    Net -1141 -1704.4 -4170 -1450            Unmeasured Urine Occurrence   1 x     Unmeasured Stool Occurrence   1 x             Physical Examination  Telemetry:  Sinus Rhythm: normal sinus rhythm   Constitutional:  No acute distress.   HEENT: Normocephalic and atraumatic.   Cardiovascular: Normal rate, regular and rhythm. Normal heart sounds.  No murmurs, gallop or rub.  No peripheral edema.   Respiratory: No respiratory distress. Normal respiratory effort.  Normal breath sounds  Clear to ascultation and percussion.    Abdominal:  Soft. No masses. Non-tender. No distension.  No HSM.   Extremities: No digital cyanosis. No clubbing.   Neurological:   Alert and Oriented to person, place, and time.  Best Eye Response: 4-->(E4) spontaneous  Best Motor Response: 6-->(M6) obeys commands  Best Verbal Response: 5-->(V5) oriented  Anju Coma Scale Score: 15   Psychiatric:  Normal affect. Normal behavior.   Lines/Drains/Airways: Peripherals       Results from last 7 days  Lab Units 07/24/17  0308 07/23/17  0312 07/22/17  0404   WBC 10*3/mm3 13.32* 10.81* 13.35*   HEMOGLOBIN g/dL 12.1* 11.4* 11.8*   MCV fL 87.1 85.9 88.0   PLATELETS 10*3/mm3 254 225 226       Results from last 7 days  Lab Units 07/24/17  0308 07/23/17  2246 07/23/17  1543  07/23/17  0312  07/22/17  0404  07/21/17  0845   SODIUM mmol/L 132 132 131*  < > 126*  < > 127*  127*  < > 121*   POTASSIUM mmol/L 4.6 4.5 5.0  < > 3.9  < > 4.0  < > 4.1   CO2 mmol/L 32.0* 35.0* 33.0*  < > 33.0*  < > 30.0  < > 27.0   CREATININE mg/dL 0.70 0.80 0.70  < > 0.70  < > 0.60  < > 0.80   MAGNESIUM mg/dL 2.1  --   --   --  2.2  --  2.5  < > 3.5*   PHOSPHORUS mg/dL 4.2  --   --   --   --   --  2.8  --  2.6   < > = values in this interval not displayed.  Estimated Creatinine Clearance: 97.4 mL/min (by C-G formula based on Cr of 0.7).    Results: Reviewed.  I reviewed the patient's new laboratory and imaging results.  I independently reviewed the patient's new images.    Medications: Reviewed.    Assessment/Plan   A / P         71 y.o.male, admitted on 7/20/2017 with Hyponatremia [E87.1]:     1. Hyponatremia: Improving  1. Desmopressin X 2: 7/21 17 HS (SQ)  7/22 3 AM (IV)  2. Tolvaptan X 1: 7/21 3 AM       Results from last 7 days  Lab Units 07/24/17  0308 07/23/17  2246 07/23/17  1543 07/23/17  0959 07/23/17  0312 07/23/17  0021 07/22/17  1827 07/22/17  1416 07/22/17  1149 07/22/17  0957 07/22/17  0609 07/22/17  0404 07/22/17  0219 07/22/17  0004 07/21/17  2203 07/21/17  1950   SODIUM mmol/L 132 132 131* 127* 126* 122* 120* 120* 123* 122* 125* 127*   127* 129* 126* 127* 130*       2. Seizures secondary to Hyponatremia.  3. COPD -= no exacerbation  4. REANNA on CPAP at home.  5. HTN  6. GERD  7. Dyslipidemia  8. H/o ETOH    Nutrition:   Diet Regular; Consistent Carbohydrate, Cardiac  Advance Directives: Full Code    Assessment / Plan:    1. Transfer to floor.  2. Hopefully home soon.    We will follow as needed once out of the Intensive Care Unit.  Hospitalist Team will assist with medical management once on the Floor.    Thank you.      Plan of care and goals reviewed during interdisciplinary rounds.  I discussed the patient's findings and my recommendations with patient and nursing staff    Rios Monsalve MD, FACP, FCCP, [facility]    Intensive Care Medicine and Pulmonary Medicine

## 2017-07-25 VITALS
RESPIRATION RATE: 24 BRPM | SYSTOLIC BLOOD PRESSURE: 116 MMHG | DIASTOLIC BLOOD PRESSURE: 72 MMHG | BODY MASS INDEX: 27.77 KG/M2 | OXYGEN SATURATION: 83 % | HEART RATE: 56 BPM | WEIGHT: 194 LBS | TEMPERATURE: 98 F | HEIGHT: 70 IN

## 2017-07-25 LAB
ANION GAP SERPL CALCULATED.3IONS-SCNC: 4 MMOL/L (ref 3–11)
BASOPHILS # BLD AUTO: 0.06 10*3/MM3 (ref 0–0.2)
BASOPHILS NFR BLD AUTO: 0.5 % (ref 0–1)
BUN BLD-MCNC: 11 MG/DL (ref 9–23)
BUN/CREAT SERPL: 15.7 (ref 7–25)
CALCIUM SPEC-SCNC: 9.2 MG/DL (ref 8.7–10.4)
CHLORIDE SERPL-SCNC: 91 MMOL/L (ref 99–109)
CO2 SERPL-SCNC: 36 MMOL/L (ref 20–31)
CREAT BLD-MCNC: 0.7 MG/DL (ref 0.6–1.3)
DEPRECATED RDW RBC AUTO: 42.6 FL (ref 37–54)
EOSINOPHIL # BLD AUTO: 2.44 10*3/MM3 (ref 0–0.3)
EOSINOPHIL NFR BLD AUTO: 18.3 % (ref 0–3)
ERYTHROCYTE [DISTWIDTH] IN BLOOD BY AUTOMATED COUNT: 13.3 % (ref 11.3–14.5)
GFR SERPL CREATININE-BSD FRML MDRD: 112 ML/MIN/1.73
GLUCOSE BLD-MCNC: 97 MG/DL (ref 70–100)
HCT VFR BLD AUTO: 36.2 % (ref 38.9–50.9)
HGB BLD-MCNC: 12.2 G/DL (ref 13.1–17.5)
IMM GRANULOCYTES # BLD: 0.08 10*3/MM3 (ref 0–0.03)
IMM GRANULOCYTES NFR BLD: 0.6 % (ref 0–0.6)
LYMPHOCYTES # BLD AUTO: 2.41 10*3/MM3 (ref 0.6–4.8)
LYMPHOCYTES NFR BLD AUTO: 18.1 % (ref 24–44)
MCH RBC QN AUTO: 29.5 PG (ref 27–31)
MCHC RBC AUTO-ENTMCNC: 33.7 G/DL (ref 32–36)
MCV RBC AUTO: 87.4 FL (ref 80–99)
MONOCYTES # BLD AUTO: 0.89 10*3/MM3 (ref 0–1)
MONOCYTES NFR BLD AUTO: 6.7 % (ref 0–12)
NEUTROPHILS # BLD AUTO: 7.42 10*3/MM3 (ref 1.5–8.3)
NEUTROPHILS NFR BLD AUTO: 55.8 % (ref 41–71)
PLATELET # BLD AUTO: 245 10*3/MM3 (ref 150–450)
PMV BLD AUTO: 9.9 FL (ref 6–12)
POTASSIUM BLD-SCNC: 4.2 MMOL/L (ref 3.5–5.5)
RBC # BLD AUTO: 4.14 10*6/MM3 (ref 4.2–5.76)
SODIUM BLD-SCNC: 131 MMOL/L (ref 132–146)
WBC NRBC COR # BLD: 13.3 10*3/MM3 (ref 3.5–10.8)

## 2017-07-25 PROCEDURE — 94799 UNLISTED PULMONARY SVC/PX: CPT

## 2017-07-25 PROCEDURE — 94640 AIRWAY INHALATION TREATMENT: CPT

## 2017-07-25 PROCEDURE — 94660 CPAP INITIATION&MGMT: CPT

## 2017-07-25 PROCEDURE — 85025 COMPLETE CBC W/AUTO DIFF WBC: CPT | Performed by: INTERNAL MEDICINE

## 2017-07-25 PROCEDURE — 25010000002 HEPARIN (PORCINE) PER 1000 UNITS: Performed by: INTERNAL MEDICINE

## 2017-07-25 PROCEDURE — 80048 BASIC METABOLIC PNL TOTAL CA: CPT | Performed by: INTERNAL MEDICINE

## 2017-07-25 PROCEDURE — 94760 N-INVAS EAR/PLS OXIMETRY 1: CPT

## 2017-07-25 PROCEDURE — 99238 HOSP IP/OBS DSCHRG MGMT 30/<: CPT | Performed by: INTERNAL MEDICINE

## 2017-07-25 RX ORDER — IPRATROPIUM BROMIDE AND ALBUTEROL SULFATE 2.5; .5 MG/3ML; MG/3ML
3 SOLUTION RESPIRATORY (INHALATION) EVERY 4 HOURS PRN
Status: DISCONTINUED | OUTPATIENT
Start: 2017-07-25 | End: 2017-07-25 | Stop reason: HOSPADM

## 2017-07-25 RX ORDER — LISINOPRIL 20 MG/1
20 TABLET ORAL
Start: 2017-07-25

## 2017-07-25 RX ORDER — METOPROLOL TARTRATE 50 MG/1
50 TABLET, FILM COATED ORAL EVERY MORNING
Start: 2017-07-25

## 2017-07-25 RX ADMIN — METOPROLOL TARTRATE 50 MG: 50 TABLET, FILM COATED ORAL at 08:27

## 2017-07-25 RX ADMIN — IPRATROPIUM BROMIDE AND ALBUTEROL SULFATE 3 ML: .5; 3 SOLUTION RESPIRATORY (INHALATION) at 08:18

## 2017-07-25 RX ADMIN — IPRATROPIUM BROMIDE AND ALBUTEROL SULFATE 3 ML: .5; 3 SOLUTION RESPIRATORY (INHALATION) at 04:21

## 2017-07-25 RX ADMIN — Medication 100 MG: at 08:27

## 2017-07-25 RX ADMIN — LISINOPRIL 20 MG: 20 TABLET ORAL at 08:27

## 2017-07-25 RX ADMIN — Medication 1 TABLET: at 08:27

## 2017-07-25 RX ADMIN — IPRATROPIUM BROMIDE AND ALBUTEROL SULFATE 3 ML: .5; 3 SOLUTION RESPIRATORY (INHALATION) at 12:18

## 2017-07-25 RX ADMIN — PANTOPRAZOLE SODIUM 40 MG: 40 TABLET, DELAYED RELEASE ORAL at 05:45

## 2017-07-25 RX ADMIN — DOCUSATE SODIUM 100 MG: 100 CAPSULE, LIQUID FILLED ORAL at 08:27

## 2017-07-25 RX ADMIN — HEPARIN SODIUM 5000 UNITS: 5000 INJECTION, SOLUTION INTRAVENOUS; SUBCUTANEOUS at 05:45

## 2017-07-25 RX ADMIN — ASPIRIN 81 MG CHEWABLE TABLET 81 MG: 81 TABLET CHEWABLE at 08:27

## 2017-07-25 RX ADMIN — FOLIC ACID 1 MG: 1 TABLET ORAL at 08:27

## 2017-07-25 NOTE — DISCHARGE INSTR - OTHER ORDERS
If you should have any questions or concerns about your recovery after you have gone home, please call the Nurse Call Center at [phone].  A registered nurse is available 24/7.

## 2017-07-25 NOTE — PLAN OF CARE
Problem: Fall Risk (Adult)  Goal: Absence of Falls  Outcome: Ongoing (interventions implemented as appropriate)    07/25/17 1003   Fall Risk (Adult)   Absence of Falls achieves outcome

## 2017-07-25 NOTE — NURSING NOTE
Contacted security to find pt sandals. Pt states he left sandals in ED. There was no grey pt slip in chart. Pt states he left sandals in ED and he did  Not bring his sandals to 2A. Sandals not found prior to discharge.

## 2017-07-25 NOTE — PROGRESS NOTES
Multidisciplinary Rounds    Time: 20min  Patient Name: Manuelito Harris  Date of Encounter: 07/25/17 9:31 AM  MRN: [id number]  Admission date: 7/20/2017      Reason for visit: MDR. RD to continue to follow per protocol.     Additional information obtained during MDR: Plans to d/c home today.    Current diet: Diet Regular; Consistent Carbohydrate, Cardiac      Intervention:  Follow treatment plan  Care plan reviewed    Follow up:   Per protocol      Leilani Choudhury RDN, LD  9:31 AM

## 2017-07-25 NOTE — PAYOR COMM NOTE
"Manuelito Loyola (71 y.o. Male) auth# [id number]    D/c      Date of Birth Social Security Number Address Home Phone MRN    1945  [address] [place], [place] [id number] [phone] [id number]    Voodoo Marital Status          None        Admission Date Admission Type Admitting Provider Attending Provider Department, Room/Bed    17 Urgent Rios Monsalve MD  [address] ICU, N204/1    Discharge Date Discharge Disposition Discharge Destination        2017 Home or Self Care             Attending Provider: (none)    Allergies:  No Known Allergies    Isolation:  None   Infection:  None   Code Status:  Prior    Ht:  70\" (177.8 cm)   Wt:  194 lb 0.1 oz (88 kg)    Admission Cmt:  None   Principal Problem:  Hyponatremia [E87.1]                 Active Insurance as of 2017     Primary Coverage     Payor Plan Insurance Group Employer/Plan Group    MEDICARE MEDICARE B ONLY      Payor Plan Address Payor Plan Phone Number Effective From Effective To    [address] [address] [phone] 2012     [place] [address]       Subscriber Name Subscriber Birth Date Member ID       MANUELITO LOYOLA 1945 [id number]           Secondary Coverage     Payor Plan Insurance Group Employer/Plan Group    [place]      Payor Plan Address Payor Plan Phone Number Effective From Effective To    [address] [address] [phone] 2017     [place] [address]       Subscriber Name Subscriber Birth Date Member ID       MANUELITO LOYOLA 1945 [id number]                 Emergency Contacts      (Rel.) Home Phone Work Phone Mobile Phone    Silvia Singh (Daughter) [phone] -- [phone]               Discharge Summary      Rios Monsalve MD at 2017 10:54 AM          Discharge Summary    Patient name: Manuelito Loyola  CSN: [id number]  MRN: [id number]  : 1945  Today's date: 2017     Date of Admission: 2017  Date of Discharge:  2017    Admitting " "Physician:  Dr. Monsalve  Primary Care Provider: Claire Nolan MD    Admission Diagnosis:  Hyponatremia     Discharge Diagnoses:   Hospital Problem List     * (Principal)Hyponatremia    Seizure    COPD (chronic obstructive pulmonary disease); on 2L continuously    Hypertension    GERD (gastroesophageal reflux disease)    REANNA on CPAP    HLD (hyperlipidemia)    H/O ETOH abuse        Allergies:  Review of patient's allergies indicates no known allergies.    Code Status:  Full Code    History of Present Illness:  Manuelito Harris is a 71 y.o. male who was seen at ED due to changes in his mental status, and confusion. His Na was 112.     He was last admitted to our facility in October 2015 for AMS and hyponatremia related seizures.  He says this is the 3rd time he has had hyponatremia related seizures, though is uncertain when that hospitalization was.     Had a GTC seizure witnessed by family at home and was confused following this.  He was taken to [facility] where his Na+ was 112 on admission (1357).  Received a total of 250 ml of 3% NaCl, (Between 4 pm and 5 pm) and solumedrol 125 mg, and 2mcg of Desmopressin IV.  Repeat Na @ 1733 was 116.     Hospital Course:  He was given tolvaptan and had a greater than expected rise in sodium.  He was given D5W and DDAVP to decrease his sodium.  It was then corrected slowly and is now 131. He has had no witnessed seizures.  It was felt that his hyponatremia was due to ETOH abuse, but he has not admitted to drinking.  None of his home medications cause hyponatremia.  He is eating and ambulating well.  It is felt at this time that he is ready to go home.    Vitals:  /72 (BP Location: Left arm, Patient Position: Lying)  Pulse 80  Temp 98 °F (36.7 °C) (Oral)   Resp 14  Ht 70\" (177.8 cm)  Wt 194 lb 0.1 oz (88 kg)  SpO2 95%  BMI 27.84 kg/m2    Physical Exam:  Constitutional:  Appears well-developed and well-nourished. No distress.   HEENT:  Normocephalic and atraumatic. " PERRL  Neck:  Neck supple. No JVD present.   CV: Normal rate, regular rhythm,  intact distal pulses.  No gallop, murmur or rub.  Pulmonary/Chest: Effort normal and breath sounds normal. No respiratory distress. No wheezes, rhonchi or rales.   Abdominal: Soft. +BS. No distension and no mass. There is no tenderness.   Musculoskeletal: Normal muscle tone and strength  Neurological: Alert and oriented to person, place, and time.  No focal deficits  Skin: Skin is warm and dry. No rash noted.   Extremities:  No clubbing, edema or cyanosis  Psychiatric: Normal mood and affect. Behavior is normal.     Labs:    Results from last 7 days  Lab Units 07/25/17  0336   WBC 10*3/mm3 13.30*   HEMOGLOBIN g/dL 12.2*   HEMATOCRIT % 36.2*   PLATELETS 10*3/mm3 245       Results from last 7 days  Lab Units 07/25/17  0336  07/23/17  0312   SODIUM mmol/L 131*  < > 126*   POTASSIUM mmol/L 4.2  < > 3.9   CHLORIDE mmol/L 91*  < > 88*   CO2 mmol/L 36.0*  < > 33.0*   BUN mg/dL 11  < > 9   CREATININE mg/dL 0.70  < > 0.70   CALCIUM mg/dL 9.2  < > 8.7   BILIRUBIN mg/dL  --   --  0.3   ALK PHOS U/L  --   --  54   ALT (SGPT) U/L  --   --  25   AST (SGOT) U/L  --   --  17   GLUCOSE mg/dL 97  < > 73   < > = values in this interval not displayed.      Magnesium   Date Value Ref Range Status   07/24/2017 2.1 1.3 - 2.7 mg/dL Final   07/23/2017 2.2 1.3 - 2.7 mg/dL Final     Phosphorus   Date Value Ref Range Status   07/24/2017 4.2 2.4 - 5.1 mg/dL Final               Discharge Medications:   Manuelito Harris   Home Medication Instructions ADELIA:[id number]    Printed on:07/25/17 3156   Medication Information                      aspirin 81 MG EC tablet  Take 81 mg by mouth Daily.             atorvastatin (LIPITOR) 20 MG tablet  Take 20 mg by mouth every night at bedtime.             cetirizine (zyrTEC) 10 MG tablet  Take 10 mg by mouth Daily.             gabapentin (NEURONTIN) 600 MG tablet  Take 600 mg by mouth 3 (Three) Times a Day.             lisinopril  (PRINIVIL,ZESTRIL) 20 MG tablet  Take 1 tablet by mouth Daily.             metoprolol tartrate (LOPRESSOR) 25 MG tablet  Take 1 tablet by mouth Every Night.             metoprolol tartrate (LOPRESSOR) 50 MG tablet  Take 1 tablet by mouth Every Morning.             multivitamin (DAILY JUWAN) tablet tablet  Take 1 tablet by mouth Daily.             omeprazole (priLOSEC) 40 MG capsule  Take 40 mg by mouth Every Morning.               Discharge Diet:   Diet Instructions     Diet: Regular, Cardiac; Thin Liquids, No Restrictions       Discharge Diet:   Regular  Cardiac      Fluid Consistency:  Thin Liquids, No Restrictions               Activity at Discharge:    Activity Instructions     Activity as Tolerated                   Follow-up Appointments  No future appointments.  Additional Instructions for the Follow-ups that You Need to Schedule     Discharge Follow-up with PCP    As directed    Follow Up Details:  one week, BP check, Sodium check               Discharge Instructions:  Ok to go home with family today  Daughter will bring portable O2 for drive home  Follow up as above  Will need sodium and bp check on follow up with PCP  Per [place] state law, he was advised to not drive for 90 days after a seizure     FANNY Hayes, AGACNP-BC, FNP-BC  Pulmonary & Critical Care Medicine    Time: Discharge 30 min    CC: Claire Nolan MD         Electronically signed by Rios Monsalve MD at 7/25/2017 11:58 AM        Discharge Order     Start     Ordered    07/25/17 1053  Discharge patient  Once     Expected Discharge Date:  07/25/17    Discharge Disposition:  Home or Self Care        07/25/17 1053

## 2017-07-25 NOTE — PAYOR COMM NOTE
"Ching Loyola (71 y.o. Male) auth# [id number]   Concurrent review update 7-25  ATT: Elinor Patient remains in ICU    Date of Birth Social Security Number Address Home Phone MRN    1945  [address] [place] [address] [phone] [id number]    Spiritism Marital Status          None        Admission Date Admission Type Admitting Provider Attending Provider Department, Room/Bed    7/20/17 Urgent Rios Monsalve MD Villaran, Yuri, MD [facility] 2A ICU, N204/1    Discharge Date Discharge Disposition Discharge Destination                      Attending Provider: Rios Monsalve MD     Allergies:  No Known Allergies    Isolation:  None   Infection:  None   Code Status:  FULL    Ht:  70\" (177.8 cm)   Wt:  194 lb 0.1 oz (88 kg)    Admission Cmt:  None   Principal Problem:  Hyponatremia [E87.1]                 Active Insurance as of 7/20/2017     Primary Coverage     Payor Plan Insurance Group Employer/Plan Group    MEDICARE MEDICARE B ONLY      Payor Plan Address Payor Plan Phone Number Effective From Effective To    [address] [address] [phone] 11/1/2012     [place] [address]       Subscriber Name Subscriber Birth Date Member ID       CHING LOYOLA 1945 [id number]           Secondary Coverage     Payor Plan Insurance Group Employer/Plan Group    [place]      Payor Plan Address Payor Plan Phone Number Effective From Effective To    [address] [address] [phone] 7/20/2017     [place] [address]       Subscriber Name Subscriber Birth Date Member ID       CHING LOYOLA 1945 [id number]                 Emergency Contacts      (Rel.) Home Phone Work Phone Mobile Phone    Silvia Singh (Daughter) [phone] -- [phone]            Hospital Medications (active)       Dose Frequency Start End    ! Home medications stored in the pharmacy.  Daily 7/21/2017     Sig - Route: Daily. - Does not apply    acetaminophen (TYLENOL) tablet 650 mg 650 mg Every 6 " Hours PRN 7/22/2017     Sig - Route: Take 2 tablets by mouth Every 6 (Six) Hours As Needed for Mild Pain (1-3), Moderate Pain (4-6) or Headache. - Oral    aspirin chewable tablet 81 mg 81 mg Daily 7/21/2017     Sig - Route: Chew 1 tablet Daily. - Oral    atorvastatin (LIPITOR) tablet 20 mg 20 mg Nightly 7/20/2017     Sig - Route: Take 1 tablet by mouth Every Night. - Oral    docusate sodium (COLACE) capsule 100 mg 100 mg 2 Times Daily 7/24/2017     Sig - Route: Take 1 capsule by mouth 2 (Two) Times a Day. - Oral    folic acid (FOLVITE) tablet 1 mg 1 mg Daily 7/25/2017     Sig - Route: Take 1 tablet by mouth Daily. - Oral    heparin (porcine) 5000 UNIT/ML injection 5,000 Units 5,000 Units Every 8 Hours Scheduled 7/20/2017     Sig - Route: Inject 1 mL under the skin Every 8 (Eight) Hours. - Subcutaneous    ipratropium-albuterol (DUO-NEB) nebulizer solution 3 mL 3 mL 4 Times Daily - RT 7/20/2017     Sig - Route: Take 3 mL by nebulization 4 (Four) Times a Day. - Nebulization    ipratropium-albuterol (DUO-NEB) nebulizer solution 3 mL 3 mL Every 4 Hours PRN 7/25/2017     Sig - Route: Take 3 mL by nebulization Every 4 (Four) Hours As Needed for Shortness of Air. - Nebulization    lisinopril (PRINIVIL,ZESTRIL) tablet 20 mg 20 mg Every 24 Hours Scheduled 7/21/2017     Sig - Route: Take 1 tablet by mouth Daily. - Oral    magnesium sulfate 4g/100mL (PREMIX) infusion 4 g Once 7/21/2017     Sig - Route: Infuse 100 mL into a venous catheter 1 (One) Time. - Intravenous    metoprolol tartrate (LOPRESSOR) tablet 25 mg 25 mg Nightly 7/23/2017     Sig - Route: Take 1 tablet by mouth Every Night. - Oral    metoprolol tartrate (LOPRESSOR) tablet 50 mg 50 mg Every Morning 7/23/2017     Sig - Route: Take 1 tablet by mouth Every Morning. - Oral    multivitamin (THERAGRAN) tablet 1 tablet 1 tablet Daily 7/24/2017     Sig - Route: Take 1 tablet by mouth Daily. - Oral    pantoprazole (PROTONIX) EC tablet 40 mg 40 mg Every Early Morning  7/21/2017     Sig - Route: Take 1 tablet by mouth Every Morning. - Oral    potassium chloride (MICRO-K) CR capsule 40 mEq 40 mEq As Needed 7/20/2017     Sig - Route: Take 4 capsules by mouth As Needed (potassium replacement.  see admin instructions). - Oral    sodium chloride 0.9 % flush 1-10 mL 1-10 mL As Needed 7/20/2017     Sig - Route: Infuse 1-10 mL into a venous catheter As Needed for Line Care. - Intravenous    thiamine (VITAMIN B-1) tablet 100 mg 100 mg Daily 7/24/2017     Sig - Route: Take 1 tablet by mouth Daily. - Oral    folic acid 1 mg in sodium chloride 0.9 % 50 mL IVPB (Discontinued) 1 mg Daily 7/21/2017 7/24/2017    Sig - Route: Infuse 1 mg into a venous catheter Daily. - Intravenous          Lab Results (last 24 hours)     Procedure Component Value Units Date/Time    CBC & Differential [[id number]] Collected:  07/25/17 0336    Specimen:  Blood Updated:  07/25/17 0531    Narrative:       The following orders were created for panel order CBC & Differential.  Procedure                               Abnormality         Status                     ---------                               -----------         ------                     CBC Auto Differential[[id number]]        Abnormal            Final result                 Please view results for these tests on the individual orders.    CBC Auto Differential [[id number]]  (Abnormal) Collected:  07/25/17 0336    Specimen:  Blood Updated:  07/25/17 0531     WBC 13.30 (H) 10*3/mm3      RBC 4.14 (L) 10*6/mm3      Hemoglobin 12.2 (L) g/dL      Hematocrit 36.2 (L) %      MCV 87.4 fL      MCH 29.5 pg      MCHC 33.7 g/dL      RDW 13.3 %      RDW-SD 42.6 fl      MPV 9.9 fL      Platelets 245 10*3/mm3      Neutrophil % 55.8 %      Lymphocyte % 18.1 (L) %      Monocyte % 6.7 %      Eosinophil % 18.3 (H) %      Basophil % 0.5 %      Immature Grans % 0.6 %      Neutrophils, Absolute 7.42 10*3/mm3      Lymphocytes, Absolute 2.41 10*3/mm3      Monocytes, Absolute 0.89  10*3/mm3      Eosinophils, Absolute 2.44 (H) 10*3/mm3      Basophils, Absolute 0.06 10*3/mm3      Immature Grans, Absolute 0.08 (H) 10*3/mm3     Basic Metabolic Panel [[id number]]  (Abnormal) Collected:  07/25/17 0336    Specimen:  Blood Updated:  07/25/17 0605     Glucose 97 mg/dL      BUN 11 mg/dL      Creatinine 0.70 mg/dL      Sodium 131 (L) mmol/L      Potassium 4.2 mmol/L      Chloride 91 (L) mmol/L      CO2 36.0 (H) mmol/L      Calcium 9.2 mg/dL      eGFR Non African Amer 112 mL/min/1.73      BUN/Creatinine Ratio 15.7     Anion Gap 4.0 mmol/L     Narrative:       National Kidney [employer] Guidelines    Stage     Description        GFR  1         Normal or High     90+  2         Mild decrease      60-89  3         Moderate decrease  30-59  4         Severe decrease    15-29  5         Kidney failure     <15        Imaging Results (last 24 hours)     ** No results found for the last 24 hours. **        Diet Orders (active)     Start     Ordered    07/20/17 2151  Diet Regular; Consistent Carbohydrate, Cardiac  Diet Effective Now      07/20/17 2156             Physician Progress Notes (last 24 hours) (Notes from 7/24/2017  9:30 AM through 7/25/2017  9:30 AM)      Rios Monsalve MD at 7/24/2017  4:45 PM  Version 1 of 1         INTENSIVIST   PROGRESS NOTE     Hospital:  LOS: 4 days     Mr. Manuelito Harris, 71 y.o. male is followed for:    Hyponatremia.    As an Intensivist, we provide an integrated approach to the ICU patient and family, medical management of comorbid conditions, lead interdisciplinary rounds and coordinate the care with all other services, including those from other specialists.     Subjective   S     Interval History:  Using his CPAP during night.  Doing well.  No complains.     The patient's relevant past medical, surgical and social history were reviewed and updated in Epic as appropriate.      ROS:   No fevers.  No chest pain.  No dyspnea.  No nausea, vomiting or diarrhea.    Objective   O      Vitals:    Temp  Min: 97.8 °F (36.6 °C)  Max: 98.8 °F (37.1 °C)  BP  Min: 105/84  Max: 160/84  Pulse  Min: 55  Max: 77  Resp  Min: 18  Max: 24  SpO2  Min: 93 %  Max: 98 % Flow (L/min)  Min: 2  Max: 2    Intake/Ouptut 24 hrs (7:00AM - 6:59 AM)  Intake & Output (last 3 days)       07/21 0701 - 07/22 0700 07/22 0701 - 07/23 0700 07/23 0701 - 07/24 0700 07/24 0701 - 07/25 0700    P.O. 1620 1820 1560     I.V. (mL/kg) 3264 (37.1) 1550.6 (17.6) 100 (1.1)     IV Piggyback 500 250 50 50    Total Intake(mL/kg) 5384 (61.2) 3620.6 (41.1) 1710 (19.4) 50 (0.6)    Urine (mL/kg/hr) 6525 (3.1) 5325 (2.5) 5880 (2.8) 1500 (1.7)    Stool   0 (0)     Total Output 6525 5325 5880 1500    Net -1141 -1704.4 -4170 -1450            Unmeasured Urine Occurrence   1 x     Unmeasured Stool Occurrence   1 x             Physical Examination  Telemetry:  Sinus Rhythm: normal sinus rhythm   Constitutional:  No acute distress.   HEENT: Normocephalic and atraumatic.   Cardiovascular: Normal rate, regular and rhythm. Normal heart sounds.  No murmurs, gallop or rub.  No peripheral edema.   Respiratory: No respiratory distress. Normal respiratory effort.  Normal breath sounds  Clear to ascultation and percussion.    Abdominal:  Soft. No masses. Non-tender. No distension. No HSM.   Extremities: No digital cyanosis. No clubbing.   Neurological:   Alert and Oriented to person, place, and time.  Best Eye Response: 4-->(E4) spontaneous  Best Motor Response: 6-->(M6) obeys commands  Best Verbal Response: 5-->(V5) oriented  [place] Coma Scale Score: 15   Psychiatric:  Normal affect. Normal behavior.   Lines/Drains/Airways: Peripherals       Results from last 7 days  Lab Units 07/24/17  0308 07/23/17  0312 07/22/17  0404   WBC 10*3/mm3 13.32* 10.81* 13.35*   HEMOGLOBIN g/dL 12.1* 11.4* 11.8*   MCV fL 87.1 85.9 88.0   PLATELETS 10*3/mm3 254 225 226       Results from last 7 days  Lab Units 07/24/17  0308 07/23/17  2246 07/23/17  1543  07/23/17  0312   07/22/17  0404  07/21/17  0845   SODIUM mmol/L 132 132 131*  < > 126*  < > 127*  127*  < > 121*   POTASSIUM mmol/L 4.6 4.5 5.0  < > 3.9  < > 4.0  < > 4.1   CO2 mmol/L 32.0* 35.0* 33.0*  < > 33.0*  < > 30.0  < > 27.0   CREATININE mg/dL 0.70 0.80 0.70  < > 0.70  < > 0.60  < > 0.80   MAGNESIUM mg/dL 2.1  --   --   --  2.2  --  2.5  < > 3.5*   PHOSPHORUS mg/dL 4.2  --   --   --   --   --  2.8  --  2.6   < > = values in this interval not displayed.  Estimated Creatinine Clearance: 97.4 mL/min (by C-G formula based on Cr of 0.7).    Results: Reviewed.  I reviewed the patient's new laboratory and imaging results.  I independently reviewed the patient's new images.    Medications: Reviewed.    Assessment/Plan   A / P         71 y.o.male, admitted on 7/20/2017 with Hyponatremia [E87.1]:     1. Hyponatremia: Improving  1. Desmopressin X 2: 7/21 17 HS (SQ)  7/22 3 AM (IV)  2. Tolvaptan X 1: 7/21 3 AM       Results from last 7 days  Lab Units 07/24/17  0308 07/23/17  2246 07/23/17  1543 07/23/17  0959 07/23/17  0312 07/23/17  0021 07/22/17  1827 07/22/17  1416 07/22/17  1149 07/22/17  0957 07/22/17  0609 07/22/17  0404 07/22/17  0219 07/22/17  0004 07/21/17  2203 07/21/17  1950   SODIUM mmol/L 132 132 131* 127* 126* 122* 120* 120* 123* 122* 125* 127*  127* 129* 126* 127* 130*       2. Seizures secondary to Hyponatremia.  3. COPD -= no exacerbation  4. REANNA on CPAP at home.  5. HTN  6. GERD  7. Dyslipidemia  8. H/o ETOH    Nutrition:   Diet Regular; Consistent Carbohydrate, Cardiac  Advance Directives: Full Code    Assessment / Plan:    1. Transfer to floor.  2. Hopefully home soon.    We will follow as needed once out of the Intensive Care Unit.  Hospitalist Team will assist with medical management once on the Floor.    Thank you.      Plan of care and goals reviewed during interdisciplinary rounds.  I discussed the patient's findings and my recommendations with patient and nursing staff    Rios Monsalve MD, FACP, FCCP,  [facility]    Intensive Care Medicine and Pulmonary Medicine          Electronically signed by Rios Monsalve MD at 7/24/2017  4:54 PM        Consult Notes (last 24 hours) (Notes from 7/24/2017  9:30 AM through 7/25/2017  9:30 AM)     No notes of this type exist for this encounter.        Physical Therapy Notes (last 24 hours) (Notes from 7/24/2017  9:30 AM through 7/25/2017  9:30 AM)     No notes of this type exist for this encounter.

## 2017-07-25 NOTE — PLAN OF CARE
Problem: Patient Care Overview (Adult)  Goal: Plan of Care Review  Outcome: Ongoing (interventions implemented as appropriate)    07/23/17 1749 07/23/17 1800 07/25/17 0636   Coping/Psychosocial Response Interventions   Plan Of Care Reviewed With --  patient --    Patient Care Overview   Progress improving --  --    Outcome Evaluation   Outcome Summary/Follow up Plan --  --  vss, ambulated very well tonight, ready for tele/possibly home today       Goal: Adult Individualization and Mutuality  Outcome: Ongoing (interventions implemented as appropriate)  Goal: Discharge Needs Assessment  Outcome: Ongoing (interventions implemented as appropriate)    Problem: Pressure Ulcer Risk ([place]ank Scale) (Adult,Obstetrics,Pediatric)  Goal: Skin Integrity  Outcome: Ongoing (interventions implemented as appropriate)    Problem: Seizure Disorder/Epilepsy (Adult)  Goal: Signs and Symptoms of Listed Potential Problems Will be Absent or Manageable (Seizure Disorder/Epilepsy)  Outcome: Ongoing (interventions implemented as appropriate)

## 2017-07-25 NOTE — DISCHARGE SUMMARY
"Discharge Summary    Patient name: Manuelito Harris  CSN: [id number]  MRN: [id number]  : 1945  Today's date: 2017     Date of Admission: 2017  Date of Discharge:  2017    Admitting Physician:  Dr. Monsalve  Primary Care Provider: Claire Nolan MD    Admission Diagnosis:  Hyponatremia     Discharge Diagnoses:   Hospital Problem List     * (Principal)Hyponatremia    Seizure    COPD (chronic obstructive pulmonary disease); on 2L continuously    Hypertension    GERD (gastroesophageal reflux disease)    REANNA on CPAP    HLD (hyperlipidemia)    H/O ETOH abuse        Allergies:  Review of patient's allergies indicates no known allergies.    Code Status:  Full Code    History of Present Illness:  Manuelito Harris is a 71 y.o. male who was seen at ED due to changes in his mental status, and confusion. His Na was 112.     He was last admitted to our facility in 2015 for AMS and hyponatremia related seizures.  He says this is the 3rd time he has had hyponatremia related seizures, though is uncertain when that hospitalization was.     Had a GTC seizure witnessed by family at home and was confused following this.  He was taken to [facility] where his Na+ was 112 on admission (1357).  Received a total of 250 ml of 3% NaCl, (Between 4 pm and 5 pm) and solumedrol 125 mg, and 2mcg of Desmopressin IV.  Repeat Na @ 1733 was 116.     Hospital Course:  He was given tolvaptan and had a greater than expected rise in sodium.  He was given D5W and DDAVP to decrease his sodium.  It was then corrected slowly and is now 131. He has had no witnessed seizures.  It was felt that his hyponatremia was due to ETOH abuse, but he has not admitted to drinking.  None of his home medications cause hyponatremia.  He is eating and ambulating well.  It is felt at this time that he is ready to go home.    Vitals:  /72 (BP Location: Left arm, Patient Position: Lying)  Pulse 80  Temp 98 °F (36.7 °C) (Oral)   Resp 14  Ht 70\" " (177.8 cm)  Wt 194 lb 0.1 oz (88 kg)  SpO2 95%  BMI 27.84 kg/m2    Physical Exam:  Constitutional:  Appears well-developed and well-nourished. No distress.   HEENT:  Normocephalic and atraumatic. PERRL  Neck:  Neck supple. No JVD present.   CV: Normal rate, regular rhythm,  intact distal pulses.  No gallop, murmur or rub.  Pulmonary/Chest: Effort normal and breath sounds normal. No respiratory distress. No wheezes, rhonchi or rales.   Abdominal: Soft. +BS. No distension and no mass. There is no tenderness.   Musculoskeletal: Normal muscle tone and strength  Neurological: Alert and oriented to person, place, and time.  No focal deficits  Skin: Skin is warm and dry. No rash noted.   Extremities:  No clubbing, edema or cyanosis  Psychiatric: Normal mood and affect. Behavior is normal.     Labs:    Results from last 7 days  Lab Units 07/25/17  0336   WBC 10*3/mm3 13.30*   HEMOGLOBIN g/dL 12.2*   HEMATOCRIT % 36.2*   PLATELETS 10*3/mm3 245       Results from last 7 days  Lab Units 07/25/17  0336  07/23/17  0312   SODIUM mmol/L 131*  < > 126*   POTASSIUM mmol/L 4.2  < > 3.9   CHLORIDE mmol/L 91*  < > 88*   CO2 mmol/L 36.0*  < > 33.0*   BUN mg/dL 11  < > 9   CREATININE mg/dL 0.70  < > 0.70   CALCIUM mg/dL 9.2  < > 8.7   BILIRUBIN mg/dL  --   --  0.3   ALK PHOS U/L  --   --  54   ALT (SGPT) U/L  --   --  25   AST (SGOT) U/L  --   --  17   GLUCOSE mg/dL 97  < > 73   < > = values in this interval not displayed.      Magnesium   Date Value Ref Range Status   07/24/2017 2.1 1.3 - 2.7 mg/dL Final   07/23/2017 2.2 1.3 - 2.7 mg/dL Final     Phosphorus   Date Value Ref Range Status   07/24/2017 4.2 2.4 - 5.1 mg/dL Final               Discharge Medications:   Manuelito Harris   Home Medication Instructions ADELIA:[id number]    Printed on:07/25/17 5375   Medication Information                      aspirin 81 MG EC tablet  Take 81 mg by mouth Daily.             atorvastatin (LIPITOR) 20 MG tablet  Take 20 mg by mouth every night at  bedtime.             cetirizine (zyrTEC) 10 MG tablet  Take 10 mg by mouth Daily.             gabapentin (NEURONTIN) 600 MG tablet  Take 600 mg by mouth 3 (Three) Times a Day.             lisinopril (PRINIVIL,ZESTRIL) 20 MG tablet  Take 1 tablet by mouth Daily.             metoprolol tartrate (LOPRESSOR) 25 MG tablet  Take 1 tablet by mouth Every Night.             metoprolol tartrate (LOPRESSOR) 50 MG tablet  Take 1 tablet by mouth Every Morning.             multivitamin (DAILY JUWAN) tablet tablet  Take 1 tablet by mouth Daily.             omeprazole (priLOSEC) 40 MG capsule  Take 40 mg by mouth Every Morning.               Discharge Diet:   Diet Instructions     Diet: Regular, Cardiac; Thin Liquids, No Restrictions       Discharge Diet:   Regular  Cardiac      Fluid Consistency:  Thin Liquids, No Restrictions               Activity at Discharge:    Activity Instructions     Activity as Tolerated                   Follow-up Appointments  No future appointments.  Additional Instructions for the Follow-ups that You Need to Schedule     Discharge Follow-up with PCP    As directed    Follow Up Details:  one week, BP check, Sodium check               Discharge Instructions:  Ok to go home with family today  Daughter will bring portable O2 for drive home  Follow up as above  Will need sodium and bp check on follow up with PCP  Per [place] state law, he was advised to not drive for 90 days after a seizure     FANNY Hayes, AGACNP-BC, FNP-BC  Pulmonary & Critical Care Medicine    Time: Discharge 30 min    CC: Claire Nolan MD

## 2017-07-25 NOTE — PLAN OF CARE
Problem: Patient Care Overview (Adult)  Goal: Plan of Care Review  Outcome: Ongoing (interventions implemented as appropriate)    07/25/17 1003   Coping/Psychosocial Response Interventions   Plan Of Care Reviewed With patient   Patient Care Overview   Progress improving

## 2017-07-25 NOTE — PROGRESS NOTES
INTENSIVIST   PROGRESS NOTE     Hospital:  LOS: 5 days     Mr. Manuelito Harris, 71 y.o. male is followed for:    Hyponatremia.    As an Intensivist, we provide an integrated approach to the ICU patient and family, medical management of comorbid conditions, lead interdisciplinary rounds and coordinate the care with all other services, including those from other specialists.     Subjective   S     Interval History:  Ready to go home.  No new issues.     The patient's relevant past medical, surgical and social history were reviewed and updated in Epic as appropriate.      ROS:   No fevers.  No chest pain.  No dyspnea.  No nausea, vomiting or diarrhea.    Objective   O     Vitals:    Temp  Min: 98 °F (36.7 °C)  Max: 98.3 °F (36.8 °C)  BP  Min: 90/50  Max: 152/74  Pulse  Min: 55  Max: 81  Resp  Min: 14  Max: 20  SpO2  Min: 90 %  Max: 98 % Flow (L/min)  Min: 2  Max: 2.5    Intake/Ouptut 24 hrs (7:00AM - 6:59 AM)  Intake & Output (last 3 days)       07/22 0701 - 07/23 0700 07/23 0701 - 07/24 0700 07/24 0701 - 07/25 0700 07/25 0701 - 07/26 0700    P.O. 1820 1560 1350     I.V. (mL/kg) 1550.6 (17.6) 100 (1.1)      IV Piggyback 250 50 50     Total Intake(mL/kg) 3620.6 (41.1) 1710 (19.4) 1400 (15.9)     Urine (mL/kg/hr) 5325 (2.5) 5880 (2.8) 3760 (1.8) 700 (1.6)    Stool  0 (0)      Total Output 5325 5880 3760 700    Net -1704.4 -4170 -2360 -700            Unmeasured Urine Occurrence  1 x      Unmeasured Stool Occurrence  1 x              Physical Examination  Telemetry:  Sinus Rhythm: normal sinus rhythm   Constitutional:  No acute distress.   HEENT: Normocephalic and atraumatic.   Cardiovascular: Normal rate, regular and rhythm. Normal heart sounds.  No murmurs, gallop or rub.  No peripheral edema.   Respiratory: No respiratory distress. Normal respiratory effort.  Normal breath sounds  Clear to ascultation and percussion.    Abdominal:  Soft. No masses. Non-tender. No distension. No HSM.   Extremities: No digital cyanosis. No  clubbing.   Neurological:   Alert and Oriented to person, place, and time.  Best Eye Response: 4-->(E4) spontaneous  Best Motor Response: 6-->(M6) obeys commands  Best Verbal Response: 5-->(V5) oriented  Anju Coma Scale Score: 15   Psychiatric:  Normal affect. Normal behavior.   Lines/Drains/Airways: Peripherals       Results from last 7 days  Lab Units 07/25/17  0336 07/24/17  0308 07/23/17 0312   WBC 10*3/mm3 13.30* 13.32* 10.81*   HEMOGLOBIN g/dL 12.2* 12.1* 11.4*   MCV fL 87.4 87.1 85.9   PLATELETS 10*3/mm3 245 254 225       Results from last 7 days  Lab Units 07/25/17  0336 07/24/17  0308 07/23/17 2246  07/23/17 0312  07/22/17  0404  07/21/17  0845   SODIUM mmol/L 131* 132 132  < > 126*  < > 127*  127*  < > 121*   POTASSIUM mmol/L 4.2 4.6 4.5  < > 3.9  < > 4.0  < > 4.1   CO2 mmol/L 36.0* 32.0* 35.0*  < > 33.0*  < > 30.0  < > 27.0   CREATININE mg/dL 0.70 0.70 0.80  < > 0.70  < > 0.60  < > 0.80   MAGNESIUM mg/dL  --  2.1  --   --  2.2  --  2.5  < > 3.5*   PHOSPHORUS mg/dL  --  4.2  --   --   --   --  2.8  --  2.6   < > = values in this interval not displayed.  Estimated Creatinine Clearance: 97.4 mL/min (by C-G formula based on Cr of 0.7).    Results: Reviewed.  I reviewed the patient's new laboratory and imaging results.  I independently reviewed the patient's new images.    Medications: Reviewed.    Assessment/Plan   A / P         71 y.o.male, admitted on 7/20/2017 with Hyponatremia [E87.1]:     1. Hyponatremia: Stable at 131.  1. Desmopressin X 2: 7/21 17 HS (SQ)  7/22 3 AM (IV)  2. Tolvaptan X 1: 7/21 3 AM       Results from last 7 days  Lab Units 07/25/17  0336 07/24/17  0308 07/23/17  2246 07/23/17  1543 07/23/17  0959 07/23/17  0312 07/23/17  0021 07/22/17  1827 07/22/17  1416 07/22/17  1149 07/22/17  0957 07/22/17  0609 07/22/17  0404 07/22/17  0219 07/22/17  0004 07/21/17  2203   SODIUM mmol/L 131* 132 132 131* 127* 126* 122* 120* 120* 123* 122* 125* 127*  127* 129* 126* 127*       2. Seizures  secondary to Hyponatremia.  3. COPD -= no exacerbation  4. REANNA on CPAP at home.  5. HTN  6. GERD  7. Dyslipidemia  8. H/o ETOH    Nutrition:   Diet Regular; Consistent Carbohydrate, Cardiac  Advance Directives: Full Code    Assessment / Plan:    1. Home today    See D/C summary.    We will follow as needed once out of the Intensive Care Unit.  Hospitalist Team will assist with medical management once on the Floor.    Thank you.      Plan of care and goals reviewed during interdisciplinary rounds.  I discussed the patient's findings and my recommendations with patient and nursing staff    Rios Monsalve MD, FACP, FCCP, [facility]    Intensive Care Medicine and Pulmonary Medicine